# Patient Record
Sex: FEMALE | Race: WHITE | NOT HISPANIC OR LATINO | URBAN - METROPOLITAN AREA
[De-identification: names, ages, dates, MRNs, and addresses within clinical notes are randomized per-mention and may not be internally consistent; named-entity substitution may affect disease eponyms.]

---

## 2022-02-22 ENCOUNTER — INPATIENT (INPATIENT)
Facility: HOSPITAL | Age: 22
LOS: 6 days | Discharge: ANOTHER HOSPITAL | DRG: 949 | End: 2022-03-01
Attending: INTERNAL MEDICINE | Admitting: INTERNAL MEDICINE
Payer: COMMERCIAL

## 2022-02-22 VITALS
HEIGHT: 67 IN | SYSTOLIC BLOOD PRESSURE: 120 MMHG | TEMPERATURE: 98 F | DIASTOLIC BLOOD PRESSURE: 72 MMHG | HEART RATE: 74 BPM | WEIGHT: 194.89 LBS | OXYGEN SATURATION: 100 % | RESPIRATION RATE: 16 BRPM

## 2022-02-22 DIAGNOSIS — I72.0 ANEURYSM OF CAROTID ARTERY: ICD-10-CM

## 2022-02-22 RX ORDER — BRIVARACETAM 25 MG/1
75 TABLET, FILM COATED ORAL
Refills: 0 | Status: DISCONTINUED | OUTPATIENT
Start: 2022-02-22 | End: 2022-02-22

## 2022-02-22 RX ORDER — LANOLIN ALCOHOL/MO/W.PET/CERES
3 CREAM (GRAM) TOPICAL AT BEDTIME
Refills: 0 | Status: DISCONTINUED | OUTPATIENT
Start: 2022-02-22 | End: 2022-03-01

## 2022-02-22 RX ORDER — SENNA PLUS 8.6 MG/1
2 TABLET ORAL AT BEDTIME
Refills: 0 | Status: DISCONTINUED | OUTPATIENT
Start: 2022-02-22 | End: 2022-03-01

## 2022-02-22 RX ORDER — ACETAMINOPHEN 500 MG
650 TABLET ORAL EVERY 6 HOURS
Refills: 0 | Status: DISCONTINUED | OUTPATIENT
Start: 2022-02-22 | End: 2022-03-01

## 2022-02-22 RX ORDER — BRIVARACETAM 25 MG/1
150 TABLET, FILM COATED ORAL
Refills: 0 | Status: DISCONTINUED | OUTPATIENT
Start: 2022-02-22 | End: 2022-02-22

## 2022-02-22 RX ORDER — BRIVARACETAM 25 MG/1
150 TABLET, FILM COATED ORAL
Refills: 0 | Status: DISCONTINUED | OUTPATIENT
Start: 2022-02-22 | End: 2022-03-01

## 2022-02-22 RX ORDER — POLYETHYLENE GLYCOL 3350 17 G/17G
17 POWDER, FOR SOLUTION ORAL DAILY
Refills: 0 | Status: DISCONTINUED | OUTPATIENT
Start: 2022-02-22 | End: 2022-03-01

## 2022-02-22 RX ORDER — HEPARIN SODIUM 5000 [USP'U]/ML
5000 INJECTION INTRAVENOUS; SUBCUTANEOUS EVERY 12 HOURS
Refills: 0 | Status: DISCONTINUED | OUTPATIENT
Start: 2022-02-22 | End: 2022-02-25

## 2022-02-22 RX ADMIN — BRIVARACETAM 150 MILLIGRAM(S): 25 TABLET, FILM COATED ORAL at 22:38

## 2022-02-22 NOTE — H&P ADULT - NSHPPHYSICALEXAM_GEN_ALL_CORE
PHYSICAL EXAM  VITALS  T(C): 36.7 (02-22-22 @ 20:06), Max: 36.7 (02-22-22 @ 20:06)  HR: 74 (02-22-22 @ 20:06) (74 - 74)  BP: 120/72 (02-22-22 @ 20:06) (120/72 - 120/72)  RR: 16 (02-22-22 @ 20:06) (16 - 16)  SpO2: 100% (02-22-22 @ 20:06) (100% - 100%)    Gen - NAD, Comfortable  HEENT - NCAT, EOMI, MMM  Neck - Supple, No limited ROM  Pulm - CTAB, No wheeze, No rhonchi, No crackles  Cardiovascular - RRR, S1S2, No murmurs  Chest - good chest expansion, good respiratory effort  Abdomen - Soft, NT/ND, +BS  Extremities - No Cyanosis, no clubbing, no edema, no calf tenderness  Neuro-     Cognitive - awake, alert, oriented to person, place, date, year, and situation     Communication - Fluent, No dysarthria     Attention: Intact, able to state days of week chronologically     Judgement: Good evidence of judgement     Memory: Recall 3 objects immediate and 3 min later     Cranial Nerves - CN 2-12 intact. No facial asymmetry, Tongue midline, EOMI, Shoulder shrug intact     Motor -                     LEFT    UE - ShAB 5/5, EF 5/5, EE 5/5,  5/5                    RIGHT UE - ShAB 5/5, EF 5/5, EE 5/5,   5/5                    LEFT    LE - HF 5/5, KE 5/5, DF 5/5, PF 5/5                    RIGHT LE - HF 5/5, KE 5/5, DF 5/5, PF 5/5        Sensory - Intact  to LT     Reflexes - DTR Intact, No primitive reflexive     Coordination - FTN intact     Tone - normal  Psychiatric - Mood stable, Affect WNL  Skin: Unable to  assess perianal area, patient stated she is on her period and was not comfortable with it. Endorsed to nursing to follow-up. Skin is otherwise intact.

## 2022-02-22 NOTE — H&P ADULT - NSHPSOCIALHISTORY_GEN_ALL_CORE
Smoking - Denied  EtOH - Denied   Drugs - Denied     Patient is a student at [employer]  PTA: Independent in ADLs and ambulation     CURRENT FUNCTIONAL STATUS 2/21  Bed Mobility: CGA  Transfers: CGA  Gait: CGA, NO AD  LB dressing: set up

## 2022-02-22 NOTE — H&P ADULT - ASSESSMENT
ASSESSMENT/PLAN  This is a 22 YO female with PMH of  epilepsy who presented to [facility] on 2/5  <6 hours after discharge for seizure activity several days after head trauma when she tripped over mats. On 2/5,  she was awakened by paramedics who told her that her roommates called 911 due to her having seizure activity for approximately 45 seconds. + seizure activities while on neur unit requiring code BA.  CT head and MRI head w/o contrast did not demonstrate acute intracranial findings. MRA head demonstrated an incidental saccular aneurysm measuring 2-3mm on the left supraclinoid/ophthalmic artery branch of the ICA. 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 MRI Brain w/ w/o done showed no evidence of Leptomeningeal enhancement. LP complete by IR on 2/10 without Xanthochromia. Hospital course further c/b seizures which were treated with medication adjustment of which seizures subsisded. Thus far, there are no seizure like activity on vEEG. Patient now with gait Instability, ADL impairments and Functional impairments.    #Left ICA Carotid Ophthalmic Artery Aneurysm  - s/p angiogram  - aneurysm clipping?  - Start Comprehensive Rehab Program: PT/OT/ST  - PT: Focused on improving strength, endurance, coordination, balance, functional mobility, and transfers  - OT: Focused on improving strength, fine motor skills, coordination, posture and ADLs.    - ST: to diagnose and treat deficits in swallowing, cognition and communication.     #Seizure  - s/p multiple seizure episode with medication adjustment  - seizure precaution    #Pain management  - Tylenol PRN, Oxycodone PRN    #DVT ppx  - Heparin, SCD, TEDs    #GI ppx  - Protonix 40mg    #Bowel Regimen  - Senna, miralax PRN    #Bladder management  - BS on admission, and q 8 hours (SC if > 400)  - Monitor UO    #FEN   - Diet: Regular    #Precaution  - Fall, Aspiration, Seizure      #Skin:  - No active issues at this time  - Pressure injury/Skin: Turn Q2hrs while in bed, OOB to Chair, PT/OT       #Sleep:   - Maintain quiet hours and low stim environment.  - Melatonin PRN to maximize participation in therapy during the day.   - Monitor sleep logs    Outpatient Follow-up (Specialty/Name of physician):        MEDICAL PROGNOSIS: GOOD            REHAB POTENTIAL: GOOD             ESTIMATED DISPOSITION: HOME WITH HOME CARE            ELOS: 10-14 Days   EXPECTED THERAPY:     P.T. 1hr/day       O.T. 1hr/day      S.L.P. 1hr/day     P&O Unnecessary     EXP FREQUENCY: 5 days per 7 day period     PRESCREEN COMPARISON:   I have reviewed the prescreen information and I have found no relevant changes between the preadmission screening and my post admission evaluation     RATIONALE FOR INPATIENT ADMISSION - Patient demonstrates the following: (check all that apply)  [X] Medically appropriate for rehabilitation admission  [X] Has attainable rehab goals with an appropriate initial discharge plan  [X] Has rehabilitation potential (expected to make a significant improvement within a reasonable period of time)   [X] Requires close medical management by a rehab physician, rehab nursing care, Hospitalist and comprehensive interdisciplinary team (including PT, OT, & or SLP, Prosthetics and Orthotics)   ASSESSMENT/PLAN  This is a 20 YO female with PMH of  epilepsy who presented to [facility] on 2/5  <6 hours after discharge for seizure activity several days after head trauma when she tripped over mats. On 2/5,  she was awakened by paramedics who told her that her roommates called 911 due to her having seizure activity for approximately 45 seconds. + seizure activities while on neur unit requiring code BA.  CT head and MRI head w/o contrast did not demonstrate acute intracranial findings. MRA head demonstrated an incidental saccular aneurysm measuring 2-3mm on the left supraclinoid/ophthalmic artery branch of the ICA. 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 MRI Brain w/ w/o done showed no evidence of Leptomeningeal enhancement. LP complete by IR on 2/10 without Xanthochromia. Hospital course further c/b seizures which were treated with medication adjustment of which seizures subsisded. Thus far, there are no seizure like activity on vEEG. Patient now with gait Instability, ADL impairments and Functional impairments.    #Left ICA Carotid Ophthalmic Artery Aneurysm  - s/p angiogram. Aneurysm clipping deferred 2/2 multiple seizure  - Start Comprehensive Rehab Program: PT/OT/ST  - PT: Focused on improving strength, endurance, coordination, balance, functional mobility, and transfers  - OT: Focused on improving strength, fine motor skills, coordination, posture and ADLs.    - ST: to diagnose and treat deficits in swallowing, cognition and communication.  - OP f/u with NSGY     #Seizure  - s/p multiple seizure episode with medication adjustment  - c/w Briviact 150mg BID  - seizure precaution    #Pain management  - Tylenol PRN    #DVT ppx  - Heparin, SCD, TEDs    #Bowel Regimen  - Senna PRN, miralax daily    #Bladder management  - BS on admission, and q 8 hours (SC if > 400)  - Monitor UO    #FEN   - Diet: Regular    #Precaution  - Fall, Aspiration, Seizure      #Skin:  - No active issues at this time  - Pressure injury/Skin: Turn Q2hrs while in bed, OOB to Chair, PT/OT     #Sleep:   - Maintain quiet hours and low stim environment.  - Melatonin PRN to maximize participation in therapy during the day.   - Monitor sleep logs    Outpatient Follow-up (Specialty/Name of physician):  PCP  NEUROLOGY  NSGY      MEDICAL PROGNOSIS: GOOD            REHAB POTENTIAL: GOOD             ESTIMATED DISPOSITION: HOME WITH HOME CARE            ELOS: 10-14 Days   EXPECTED THERAPY:     P.T. 1hr/day       O.T. 1hr/day      S.L.P. 1hr/day     P&O Unnecessary     EXP FREQUENCY: 5 days per 7 day period     PRESCREEN COMPARISON:   I have reviewed the prescreen information and I have found no relevant changes between the preadmission screening and my post admission evaluation     RATIONALE FOR INPATIENT ADMISSION - Patient demonstrates the following: (check all that apply)  [X] Medically appropriate for rehabilitation admission  [X] Has attainable rehab goals with an appropriate initial discharge plan  [X] Has rehabilitation potential (expected to make a significant improvement within a reasonable period of time)   [X] Requires close medical management by a rehab physician, rehab nursing care, Hospitalist and comprehensive interdisciplinary team (including PT, OT, & or SLP, Prosthetics and Orthotics)   ASSESSMENT/PLAN  This is a 22 YO female with PMH of  epilepsy who presented to [facility] on 2/5  <6 hours after discharge for seizure activity several days after head trauma when she tripped over mats. On 2/5,  she was awakened by paramedics who told her that her roommates called 911 due to her having seizure activity for approximately 45 seconds. + seizure activities while on neur unit requiring code BA.  CT head and MRI head w/o contrast did not demonstrate acute intracranial findings. MRA head demonstrated an incidental saccular aneurysm measuring 2-3mm on the left supraclinoid/ophthalmic artery branch of the ICA. 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 MRI Brain w/ w/o done showed no evidence of Leptomeningeal enhancement. LP complete by IR on 2/10 without Xanthochromia. Hospital course further c/b seizures which were treated with medication adjustment of which seizures subsisded. Thus far, there are no seizure like activity on vEEG. Patient now with gait Instability, ADL impairments and Functional impairments.      #Seizure  - s/p multiple seizure episode with medication adjustment  - c/w Briviact 150mg BID  - seizure precaution  - commence Comprehensive Rehab Program: PT/OT/ST  - PT: Focused on improving strength, endurance, coordination, balance, functional mobility, and transfers  - OT: Focused on improving strength, fine motor skills, coordination, posture and ADLs.    - ST: to diagnose and treat deficits in swallowing, cognition and communication.  - OP f/u with NSGY     #Left ICA Carotid Ophthalmic Artery Aneurysm  - s/p angiogram. Aneurysm clipping deferred 2/2 multiple seizure    #Seizure  - s/p multiple seizure episode with medication adjustment  - c/w Briviact 150mg BID  - seizure precaution    #Pain management  - Tylenol PRN    #DVT ppx  - Heparin, SCD, TEDs    #Bowel Regimen  - Senna PRN, miralax daily    #Bladder management  - BS on admission, and q 8 hours (SC if > 400)  - Monitor UO    #FEN  Diet: Regular    #Precaution Fall, Aspiration, Seizure      #Skin:--unremarkable     Outpatient Follow-up (Specialty/Name of physician):  PCP  NEUROLOGY  NSGY      MEDICAL PROGNOSIS: GOOD            REHAB POTENTIAL: GOOD             ESTIMATED DISPOSITION: HOME WITH HOME CARE            ELOS: 10-14 Days   EXPECTED THERAPY:     P.T. 1hr/day       O.T. 1hr/day      S.L.P. 1hr/day     P&O Unnecessary     EXP FREQUENCY: 5 days per 7 day period     PRESCREEN COMPARISON:   I have reviewed the prescreen information and I have found no relevant changes between the preadmission screening and my post admission evaluation     RATIONALE FOR INPATIENT ADMISSION - Patient demonstrates the following: (check all that apply)  [X] Medically appropriate for rehabilitation admission  [X] Has attainable rehab goals with an appropriate initial discharge plan  [X] Has rehabilitation potential (expected to make a significant improvement within a reasonable period of time)   [X] Requires close medical management by a rehab physician, rehab nursing care, Hospitalist and comprehensive interdisciplinary team (including PT, OT, & or SLP, Prosthetics and Orthotics)

## 2022-02-22 NOTE — H&P ADULT - HISTORY OF PRESENT ILLNESS
This is a 20 YO female with known epilepsy who presented to [facility] ER on 2/5  <6 hours after discharge for seizure activity several days after head trauma when she tripped over mats. Patient was recently admitted for seizure activity after head trauma and received MRI of brain + cervical cord spinal w/o contrast, EEG that showed no epileptiform activity. She was discharged home on 2/4 evening. On 2/5,  she was awakened by paramedics and was told that her roommates had called 911 due to her having seizure activity for approximately 45 seconds. Episode was staring for 45 seconds and being unresponsive. No fall, no trauma. She is not postictal. Patient was brought to ER for evaluation on evening of 2/4. In ER, patient received head CT w/o Contrast which was with no intracranial pathology, CXR with final read pending but no acute concerns, and was admitted to pediatric neurology service for multi-hour vEEG. In ED, CBC, Chem, LFTs, lactic acid, Utox reassuring. Beta-quant negative. COVID-19 PCR negative.    Upon arrival on the neurology floor, she  was  NAD, conversant, oriented. Patient stated that she remembers leaving the hospital and going to  her Briavact medication and Valtoco rescue, heading back home to her dorm where she instructed her teammates on how to use the rescue Valtoco, and then went to sleep on an air mattress. Patient states that she has been very sleep deprived during the last hospitalization, and that she felt that she needed to get rest. She then remembered having a seizure which felt similar to her previous complex partial seizures, patient did not have loss of consciousness per patient, however, she does not fully recall the circumstances of the event. No tongue biting, no loss of urine. Although patient was told that she had an absence seizure, patient felt that it felt similar to previous complex partial. The seizure felt milder than previous seizures, and patient’s teammates did not administer Valtoco. Patient states that she wanted to stay at home and sleep following the episode but was told by responding authorities to present to ED.       While on the Pediatric Neurology service she had a seizure and was subsequently loaded with Fosphenytoin. However patient never came back to baseline and CODE BAT was called. CT head and MRI head w/o contrast did not demonstrate acute intracranial findings. MRA head demonstrated an incidental saccular aneurysm measuring 2-3mm on the left supraclinoid/ophthalmic artery branch of the ICA. She was subsequently transferred to [facility] for LP and Angiogram. 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 MRI Brain w/ w/o done showed no evidence of Leptomeningeal enhancement. LP complete by IR on 2/10 without Xanthochromia.     On 2/11/21, she had 3 clinical seizures not captured on VEEG. Loaded with Phenytoin 400mg and continued with 200mg q8. VEEG placed back on without any evidence of seizure activity. Than on 2/12 patient agitated and was placed on Precedex drip. On 2/12/22 Phenytoin level critical and was discontinued. On 2/13 overnight patient had 4 seizure events that were not captured on vEEG. Originally was going to have Aneurysm clipped however due to seizures and thought to be incidental in nature in anyways procedure was delayed. Patient downgraded to General Neurology for VEEG monitoring. She has been down titrated off Briviact in hopes to catch an event to make a definitive diagnosis of Seizure disorder vs PNES (Psychogenic nonepileptic seizure). Thus far, there are no seizure like activity on vEEG. Patient was evaluated by PM&R and therapy for functional deficits, gait/ADL impairments and acute rehabilitation was recommended. Patient was medically optimized for discharge to [facility] IRU on 2/22/22.     This is a 20 YO female with known epilepsy who presented to [facility] ER on 2/5  <6 hours after discharge for seizure activity several days after head trauma when she tripped over mats. Patient was recently admitted for seizure activity after head trauma and received MRI of brain + cervical cord spinal w/o contrast, EEG that showed no epileptiform activity. She was discharged home on 2/4 evening. On 2/5,  she was awakened by paramedics and was told that her roommates had called 911 due to her having seizure activity for approximately 45 seconds. Episode was staring for 45 seconds and being unresponsive. No fall, no trauma. She is not postictal. Patient was brought to ER for evaluation on evening of 2/4. In ER, patient received head CT w/o Contrast which was with no intracranial pathology, CXR with final read pending but no acute concerns, and was admitted to pediatric neurology service for multi-hour vEEG. In ED, CBC, Chem, LFTs, lactic acid, Utox reassuring. Beta-quant negative. COVID-19 PCR negative.    Upon arrival on the neurology floor, she  was  NAD, conversant, oriented. Patient stated that she remembers leaving the hospital and going to  her Briavact medication and Valtoco rescue, heading back home to her dorm where she instructed her teammates on how to use the rescue Valtoco, and then went to sleep on an air mattress. Patient states that she has been very sleep deprived during the last hospitalization, and that she felt that she needed to get rest. She then remembered having a seizure which felt similar to her previous complex partial seizures, patient did not have loss of consciousness per patient, however, she does not fully recall the circumstances of the event. No tongue biting, no loss of urine. Although patient was told that she had an absence seizure, patient felt that it felt similar to previous complex partial. The seizure felt milder than previous seizures, and patient’s teammates did not administer Valtoco. Patient states that she wanted to stay at home and sleep following the episode but was told by responding authorities to present to ED.       While on the Pediatric Neurology service she had a seizure and was subsequently loaded with Fosphenytoin. However patient never came back to baseline and CODE BAT was called. CT head and MRI head w/o contrast did not demonstrate acute intracranial findings. MRA head demonstrated an incidental saccular aneurysm measuring 2-3mm on the left supraclinoid/ophthalmic artery branch of the ICA. She was subsequently transferred to N[facility]U for LP and Angiogram. 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 MRI Brain w/ w/o done showed no evidence of Leptomeningeal enhancement. LP complete by IR on 2/10 without Xanthochromia.     On 2/11/21, she had 3 clinical seizures not captured on VEEG. Loaded with Phenytoin 400mg and continued with 200mg q8. VEEG placed back on without any evidence of seizure activity. Than on 2/12 patient agitated and was placed on Precedex drip. On 2/12/22 Phenytoin level critical and was discontinued. On 2/13 overnight patient had 4 seizure events that were not captured on vEEG. Originally was going to have Aneurysm clipped however due to seizures and thought to be incidental in nature in anyways procedure was delayed. Patient downgraded to General Neurology for VEEG monitoring. She has been down titrated off Briviact in hopes to catch an event to make a definitive diagnosis of Seizure disorder vs PNES (Psychogenic nonepileptic seizure). Thus far, there are no seizure like activity on vEEG.  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 she was placed back on Briviact 150mg BID  with recommendation to f/u with neurology OP. Patient was evaluated by PM&R and therapy for functional deficits, gait/ADL impairments and acute rehabilitation was recommended. Patient was medically optimized for discharge to [facility] IRU on 2/22/22.

## 2022-02-22 NOTE — PATIENT PROFILE ADULT - FALL HARM RISK - HARM RISK INTERVENTIONS
Communicate Risk of Fall with Harm to all staff/Reinforce activity limits and safety measures with patient and family/Tailored Fall Risk Interventions/Visual Cue: Yellow wristband and red socks/Bed in lowest position, wheels locked, appropriate side rails in place/Call bell, personal items and telephone in reach/Instruct patient to call for assistance before getting out of bed or chair/Non-slip footwear when patient is out of bed/[place] to call system/Physically safe environment - no spills, clutter or unnecessary equipment/Purposeful Proactive Rounding/Room/bathroom lighting operational, light cord in reach Assistance with ambulation/Assistance OOB with selected safe patient handling equipment/Communicate Risk of Fall with Harm to all staff/Discuss with provider need for PT consult/Monitor gait and stability/Provide patient with walking aids - walker, cane, crutches/Reinforce activity limits and safety measures with patient and family/Tailored Fall Risk Interventions/Visual Cue: Yellow wristband and red socks/Bed in lowest position, wheels locked, appropriate side rails in place/Call bell, personal items and telephone in reach/Instruct patient to call for assistance before getting out of bed or chair/Non-slip footwear when patient is out of bed/[place] to call system/Physically safe environment - no spills, clutter or unnecessary equipment/Purposeful Proactive Rounding/Room/bathroom lighting operational, light cord in reach

## 2022-02-22 NOTE — H&P ADULT - NSHPLABSRESULTS_GEN_ALL_CORE
Laboratory-Chemistry:    2/22/22    Glucose: 85    Sodium: 139    Potassium: 4.8    Blood Urea Nitrogen : 11    Creatinine: 0.69       Hematology: 2/22/22    WBC: 11.2    HgB: 12.9    Hct: 40.3    Platelets: 265    2/21/22    WBC: 8.46    HgB: 13.8    Hct: 42.5    Platelets: 253   Cultures:     2/21/22 COVID PCR: not detected     Radiology:       EEG 2/19:  EPILEPTIFORM ACTIVITY: No epileptiform discharges in this study. SEIZURES: No seizures are recorded.   IVs:  IV Access: Saline Lock Laboratory-Chemistry: 2/22/22    Glucose: 85    Sodium: 139    Potassium: 4.8    Blood Urea Nitrogen : 11    Creatinine: 0.69     Hematology: 2/22/22    WBC: 11.2    HgB: 12.9    Hct: 40.3    Platelets: 265    2/21/22    WBC: 8.46    HgB: 13.8    Hct: 42.5    Platelets: 253     Cultures:     2/21/22 COVID PCR: not detected     Radiology:       EEG 2/19:  EPILEPTIFORM ACTIVITY: No epileptiform discharges in this study. SEIZURES: No seizures are recorded.   IVs:  IV Access: Saline Lock

## 2022-02-22 NOTE — H&P ADULT - ATTENDING COMMENTS
Seen and examined, findings as noted with revisions. additions as stated below    20 y/o F, no significant PMH, visiting [place] from [place] for [employer] sports  Fall and subsequent seizure activity, and hosp admission/dc 2/4 with uinremarkable investigation result  Readmission same few hours prior 2/5 with recurrent seizure activity, subsequent withnessed seizures in hosp requiring treatment with antiepileptic meds up till discharge  However vEEG has not captured seiuzre activity, investigations, including utox, LP unremarkable but has incidental finding of a Left ICA Carotid Ophthalmic Artery Aneurysm. Definitive surgical treatment (embolization) deferred due to seizure activity  Acute Rehab admission 2/22    Pleasant during review, reports no acute stress, emotional or psychological stress.  Admits to intermittent and infrequent head ache   No hx of oral contraceptive use    Has stable family support. Mother currently in [place] to support her    Independent with ADLs/IADs,   Post acute care, now requiring contact guard assistance/supervision for transfers/mobility/gait    AAO X 3  No speech or motor deficits  LT sensation normal  Good static gait    Has unremarkable physical exam  Admission lab results--unremarkable    Dx: Seizure (recurrent) disorder ? etiology  Subsequent mild compromise of function--requiring supervision and CGA to function  Commence PT/OT--addressing gait/balance and ADLs  - PT: Focused on improving strength, endurance, coordination, balance, functional mobility, and transfers  - OT: Focused on improving strength, fine motor skills, coordination, posture and ADLs.    - ST: to diagnose and treat deficits in swallowing, cognition and communication.    - c/w Briviact 150mg BID  - seizure precaution    Left ICA Carotid Ophthalmic Artery Aneurysm- Aneurysm clipping deferred 2/2 multiple seizure  - OP f/u with NSGY   Continue all supportive treatments  Await collateral hx from family  Est dc to be decided at team conference

## 2022-02-23 LAB
ALBUMIN SERPL ELPH-MCNC: 3.3 G/DL — SIGNIFICANT CHANGE UP (ref 3.3–5)
ALP SERPL-CCNC: 40 U/L — SIGNIFICANT CHANGE UP (ref 40–120)
ALT FLD-CCNC: 34 U/L — SIGNIFICANT CHANGE UP (ref 10–45)
ANION GAP SERPL CALC-SCNC: 9 MMOL/L — SIGNIFICANT CHANGE UP (ref 5–17)
AST SERPL-CCNC: 16 U/L — SIGNIFICANT CHANGE UP (ref 10–40)
BASOPHILS # BLD AUTO: 0.05 K/UL — SIGNIFICANT CHANGE UP (ref 0–0.2)
BASOPHILS NFR BLD AUTO: 0.6 % — SIGNIFICANT CHANGE UP (ref 0–2)
BILIRUB SERPL-MCNC: 0.2 MG/DL — SIGNIFICANT CHANGE UP (ref 0.2–1.2)
BUN SERPL-MCNC: 13 MG/DL — SIGNIFICANT CHANGE UP (ref 7–23)
CALCIUM SERPL-MCNC: 8.7 MG/DL — SIGNIFICANT CHANGE UP (ref 8.4–10.5)
CHLORIDE SERPL-SCNC: 106 MMOL/L — SIGNIFICANT CHANGE UP (ref 96–108)
CO2 SERPL-SCNC: 26 MMOL/L — SIGNIFICANT CHANGE UP (ref 22–31)
CREAT SERPL-MCNC: 0.73 MG/DL — SIGNIFICANT CHANGE UP (ref 0.5–1.3)
EOSINOPHIL # BLD AUTO: 0.24 K/UL — SIGNIFICANT CHANGE UP (ref 0–0.5)
EOSINOPHIL NFR BLD AUTO: 2.7 % — SIGNIFICANT CHANGE UP (ref 0–6)
GLUCOSE SERPL-MCNC: 88 MG/DL — SIGNIFICANT CHANGE UP (ref 70–99)
HCT VFR BLD CALC: 39.6 % — SIGNIFICANT CHANGE UP (ref 34.5–45)
HGB BLD-MCNC: 12.6 G/DL — SIGNIFICANT CHANGE UP (ref 11.5–15.5)
IMM GRANULOCYTES NFR BLD AUTO: 0.5 % — SIGNIFICANT CHANGE UP (ref 0–1.5)
LYMPHOCYTES # BLD AUTO: 2.23 K/UL — SIGNIFICANT CHANGE UP (ref 1–3.3)
LYMPHOCYTES # BLD AUTO: 25.3 % — SIGNIFICANT CHANGE UP (ref 13–44)
MCHC RBC-ENTMCNC: 30.4 PG — SIGNIFICANT CHANGE UP (ref 27–34)
MCHC RBC-ENTMCNC: 31.8 GM/DL — LOW (ref 32–36)
MCV RBC AUTO: 95.4 FL — SIGNIFICANT CHANGE UP (ref 80–100)
MONOCYTES # BLD AUTO: 0.75 K/UL — SIGNIFICANT CHANGE UP (ref 0–0.9)
MONOCYTES NFR BLD AUTO: 8.5 % — SIGNIFICANT CHANGE UP (ref 2–14)
NEUTROPHILS # BLD AUTO: 5.49 K/UL — SIGNIFICANT CHANGE UP (ref 1.8–7.4)
NEUTROPHILS NFR BLD AUTO: 62.4 % — SIGNIFICANT CHANGE UP (ref 43–77)
NRBC # BLD: 0 /100 WBCS — SIGNIFICANT CHANGE UP (ref 0–0)
PLATELET # BLD AUTO: 241 K/UL — SIGNIFICANT CHANGE UP (ref 150–400)
POTASSIUM SERPL-MCNC: 3.9 MMOL/L — SIGNIFICANT CHANGE UP (ref 3.5–5.3)
POTASSIUM SERPL-SCNC: 3.9 MMOL/L — SIGNIFICANT CHANGE UP (ref 3.5–5.3)
PROT SERPL-MCNC: 7 G/DL — SIGNIFICANT CHANGE UP (ref 6–8.3)
RBC # BLD: 4.15 M/UL — SIGNIFICANT CHANGE UP (ref 3.8–5.2)
RBC # FLD: 14.2 % — SIGNIFICANT CHANGE UP (ref 10.3–14.5)
SARS-COV-2 RNA SPEC QL NAA+PROBE: SIGNIFICANT CHANGE UP
SODIUM SERPL-SCNC: 141 MMOL/L — SIGNIFICANT CHANGE UP (ref 135–145)
WBC # BLD: 8.8 K/UL — SIGNIFICANT CHANGE UP (ref 3.8–10.5)
WBC # FLD AUTO: 8.8 K/UL — SIGNIFICANT CHANGE UP (ref 3.8–10.5)

## 2022-02-23 PROCEDURE — 99223 1ST HOSP IP/OBS HIGH 75: CPT

## 2022-02-23 PROCEDURE — 99222 1ST HOSP IP/OBS MODERATE 55: CPT

## 2022-02-23 RX ADMIN — Medication 3 MILLIGRAM(S): at 22:06

## 2022-02-23 RX ADMIN — BRIVARACETAM 150 MILLIGRAM(S): 25 TABLET, FILM COATED ORAL at 06:19

## 2022-02-23 RX ADMIN — HEPARIN SODIUM 5000 UNIT(S): 5000 INJECTION INTRAVENOUS; SUBCUTANEOUS at 18:27

## 2022-02-23 RX ADMIN — POLYETHYLENE GLYCOL 3350 17 GRAM(S): 17 POWDER, FOR SOLUTION ORAL at 12:16

## 2022-02-23 RX ADMIN — BRIVARACETAM 150 MILLIGRAM(S): 25 TABLET, FILM COATED ORAL at 18:59

## 2022-02-23 RX ADMIN — HEPARIN SODIUM 5000 UNIT(S): 5000 INJECTION INTRAVENOUS; SUBCUTANEOUS at 06:19

## 2022-02-23 NOTE — DIETITIAN INITIAL EVALUATION ADULT. - ADD RECOMMEND
1) Monitor Weights, Intake, Tolerance, Skin & Labwork   2) Education Provided on Proper Nutrition & Need for Increased Fluids 3) Continue Nutrition Plan of Care

## 2022-02-23 NOTE — DIETITIAN INITIAL EVALUATION ADULT. - PERSON TAUGHT/METHOD
Education Provided on Proper Nutrition & Need for Increased Fluids/verbal instruction/patient instructed

## 2022-02-23 NOTE — DIETITIAN INITIAL EVALUATION ADULT. - OTHER INFO
Initial Nutrition Assessment   21yr Old Female   Dx: Left ICA Carotid Ophthalmic Artery Aneurysm  Diet - Regular Diet (IDDSI Level 7) w/ Thin Liquids (IDDSI Level 0)   Denies Food Allergy/Intolerance  Tolerates Diet Well - No Chewing/Swallowing Complications (Per Patient)  No Pressure Ulcers (as Per Nursing Flow Sheets)  No Edema Noted (as Per Nursing Flow Sheets)  No Recent Nausea/Vomiting/Diarrhea & Some Recent Constipation (as Per Patient)

## 2022-02-23 NOTE — DIETITIAN INITIAL EVALUATION ADULT. - ORAL INTAKE PTA/DIET HISTORY
Patient Does Not Follow Diet @Home, Consumes 2 Meals a Day w/ Occasioanl Snack or Protein Drink if Training for Lacrosse, Usually Eats @ [employer] [employer] & Does Take Vitamin/Supplements @Home (Iron, Magnesium, Zinc, Multivitamin, & Pea/Whey Protien)    on Regular Diet (IDDSI Level 7) w/ Thin Liquids (IDDSI Level 0)   Education Provided on Proper Nutrition & Need for Increased Fluids

## 2022-02-23 NOTE — DIETITIAN INITIAL EVALUATION ADULT. - EDUCATION DIETARY MODIFICATIONS
Education Provided on Proper Nutrition & Need for Increased Fluids/(2) meets goals/outcomes/verbalization

## 2022-02-24 LAB
ALBUMIN SERPL ELPH-MCNC: 3.8 G/DL — SIGNIFICANT CHANGE UP (ref 3.3–5)
ALP SERPL-CCNC: 45 U/L — SIGNIFICANT CHANGE UP (ref 40–120)
ALT FLD-CCNC: 35 U/L — SIGNIFICANT CHANGE UP (ref 10–45)
ANION GAP SERPL CALC-SCNC: 11 MMOL/L — SIGNIFICANT CHANGE UP (ref 5–17)
AST SERPL-CCNC: 17 U/L — SIGNIFICANT CHANGE UP (ref 10–40)
BILIRUB SERPL-MCNC: 0.2 MG/DL — SIGNIFICANT CHANGE UP (ref 0.2–1.2)
BUN SERPL-MCNC: 13 MG/DL — SIGNIFICANT CHANGE UP (ref 7–23)
CALCIUM SERPL-MCNC: 9.4 MG/DL — SIGNIFICANT CHANGE UP (ref 8.4–10.5)
CHLORIDE SERPL-SCNC: 105 MMOL/L — SIGNIFICANT CHANGE UP (ref 96–108)
CO2 SERPL-SCNC: 28 MMOL/L — SIGNIFICANT CHANGE UP (ref 22–31)
CREAT SERPL-MCNC: 0.92 MG/DL — SIGNIFICANT CHANGE UP (ref 0.5–1.3)
GLUCOSE SERPL-MCNC: 83 MG/DL — SIGNIFICANT CHANGE UP (ref 70–99)
HCT VFR BLD CALC: 41.4 % — SIGNIFICANT CHANGE UP (ref 34.5–45)
HGB BLD-MCNC: 13.1 G/DL — SIGNIFICANT CHANGE UP (ref 11.5–15.5)
MCHC RBC-ENTMCNC: 30 PG — SIGNIFICANT CHANGE UP (ref 27–34)
MCHC RBC-ENTMCNC: 31.6 GM/DL — LOW (ref 32–36)
MCV RBC AUTO: 94.7 FL — SIGNIFICANT CHANGE UP (ref 80–100)
NRBC # BLD: 0 /100 WBCS — SIGNIFICANT CHANGE UP (ref 0–0)
PLATELET # BLD AUTO: 257 K/UL — SIGNIFICANT CHANGE UP (ref 150–400)
POTASSIUM SERPL-MCNC: 3.7 MMOL/L — SIGNIFICANT CHANGE UP (ref 3.5–5.3)
POTASSIUM SERPL-SCNC: 3.7 MMOL/L — SIGNIFICANT CHANGE UP (ref 3.5–5.3)
PROT SERPL-MCNC: 7.7 G/DL — SIGNIFICANT CHANGE UP (ref 6–8.3)
RBC # BLD: 4.37 M/UL — SIGNIFICANT CHANGE UP (ref 3.8–5.2)
RBC # FLD: 14.3 % — SIGNIFICANT CHANGE UP (ref 10.3–14.5)
SODIUM SERPL-SCNC: 144 MMOL/L — SIGNIFICANT CHANGE UP (ref 135–145)
WBC # BLD: 8.84 K/UL — SIGNIFICANT CHANGE UP (ref 3.8–10.5)
WBC # FLD AUTO: 8.84 K/UL — SIGNIFICANT CHANGE UP (ref 3.8–10.5)

## 2022-02-24 PROCEDURE — 99233 SBSQ HOSP IP/OBS HIGH 50: CPT

## 2022-02-24 RX ADMIN — HEPARIN SODIUM 5000 UNIT(S): 5000 INJECTION INTRAVENOUS; SUBCUTANEOUS at 17:56

## 2022-02-24 RX ADMIN — Medication 3 MILLIGRAM(S): at 21:52

## 2022-02-24 RX ADMIN — BRIVARACETAM 150 MILLIGRAM(S): 25 TABLET, FILM COATED ORAL at 17:56

## 2022-02-24 RX ADMIN — POLYETHYLENE GLYCOL 3350 17 GRAM(S): 17 POWDER, FOR SOLUTION ORAL at 12:44

## 2022-02-24 RX ADMIN — HEPARIN SODIUM 5000 UNIT(S): 5000 INJECTION INTRAVENOUS; SUBCUTANEOUS at 05:28

## 2022-02-24 RX ADMIN — BRIVARACETAM 150 MILLIGRAM(S): 25 TABLET, FILM COATED ORAL at 05:26

## 2022-02-24 NOTE — PROGRESS NOTE ADULT - ASSESSMENT
ASSESSMENT/PLAN  This is a 20 YO female with PMH of  epilepsy who presented to [place] ER on 2/5  <6 hours after discharge for seizure activity several days after head trauma when she tripped over mats. On 2/5,  she was awakened by paramedics who told her that her roommates called 911 due to her having seizure activity for approximately 45 seconds. + seizure activities while on neur unit requiring code BA.  CT head and MRI head w/o contrast did not demonstrate acute intracranial findings. MRA head demonstrated an incidental saccular aneurysm measuring 2-3mm on the left supraclinoid/ophthalmic artery branch of the ICA. 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 MRI Brain w/ w/o done showed no evidence of Leptomeningeal enhancement. LP complete by IR on 2/10 without Xanthochromia. Hospital course further c/b seizures which were treated with medication adjustment of which seizures subsisded. Thus far, there are no seizure like activity on vEEG. Patient now with gait Instability, ADL impairments and Functional impairments.      #Seizure  - s/p multiple seizure episode with medication adjustment  - c/w Briviact 150mg BID  - seizure precaution  - commence Comprehensive Rehab Program: PT/OT/ST  - PT: Focused on improving strength, endurance, coordination, balance, functional mobility, and transfers  - OT: Focused on improving strength, fine motor skills, coordination, posture and ADLs.    - ST: to diagnose and treat deficits in swallowing, cognition and communication.  - OP f/u with NSGY     #Left ICA Carotid Ophthalmic Artery Aneurysm  - s/p angiogram. Aneurysm clipping deferred 2/2 multiple seizure    #Seizure  - s/p multiple seizure episode with medication adjustment  - c/w Briviact 150mg BID  - seizure precaution    #Pain management  - Tylenol PRN    #DVT ppx  - Heparin, SCD, TEDs    #Bowel Regimen  - Senna PRN, miralax daily    #Bladder management  - BS on admission, and q 8 hours (SC if > 400)  - Monitor UO    #FEN  Diet: Regular    #Precaution Fall, Aspiration, Seizure      #Skin:--unremarkable     Outpatient Follow-up (Specialty/Name of physician):  PCP  NEUROLOGY  NSGY ASSESSMENT/PLAN  This is a 22 YO female with PMH of  epilepsy who presented to [place] ER on 2/5  <6 hours after discharge for seizure activity several days after head trauma when she tripped over mats. On 2/5,  she was awakened by paramedics who told her that her roommates called 911 due to her having seizure activity for approximately 45 seconds. + seizure activities while on neur unit requiring code BA.  CT head and MRI head w/o contrast did not demonstrate acute intracranial findings. MRA head demonstrated an incidental saccular aneurysm measuring 2-3mm on the left supraclinoid/ophthalmic artery branch of the ICA. 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 MRI Brain w/ w/o done showed no evidence of Leptomeningeal enhancement. LP complete by IR on 2/10 without Xanthochromia. Hospital course further c/b seizures which were treated with medication adjustment of which seizures subsisded. Thus far, there are no seizure like activity on vEEG. Patient now with gait Instability, ADL impairments and Functional impairments.      #Seizure  - s/p multiple seizure episode with medication adjustment  - c/w Briviact 150mg BID  - seizure precaution  - commence Comprehensive Rehab Program: PT/OT/ST  - PT: Focused on improving strength, endurance, coordination, balance, functional mobility, and transfers  - OT: Focused on improving strength, fine motor skills, coordination, posture and ADLs.    - ST: to diagnose and treat deficits in swallowing, cognition and communication.  - OP f/u with NSGY     #Left ICA Carotid Ophthalmic Artery Aneurysm  - s/p angiogram. Aneurysm clipping deferred 2/2 multiple seizure    #Seizure  - s/p multiple seizure episode with medication adjustment  - c/w Briviact 150mg BID  - seizure precaution    #Pain management  - Tylenol PRN    #DVT ppx  - Heparin, SCD, TEDs    #Bowel Regimen  - Senna PRN, miralax daily    #Bladder management  - BS on admission, and q 8 hours (SC if > 400)  - Monitor UO    #FEN  Diet: Regular    #Precaution Fall, Aspiration, Seizure      #Dispo: IDR 02/24/2022  - OT: independent eating; Stefany grooming; supervision for rest of ADLs  - SLP: decreased recall and concentration  - TDD: 3/1 to [facility]    Outpatient Follow-up (Specialty/Name of physician):  PCP  NEUROLOGY  NSGY ASSESSMENT/PLAN  This is a 20 YO female with PMH of  epilepsy who presented to [place] ER on 2/5  <6 hours after discharge for seizure activity several days after head trauma when she tripped over mats. On 2/5,  she was awakened by paramedics who told her that her roommates called 911 due to her having seizure activity for approximately 45 seconds. + seizure activities while on neur unit requiring code BA.  CT head and MRI head w/o contrast did not demonstrate acute intracranial findings. MRA head demonstrated an incidental saccular aneurysm measuring 2-3mm on the left supraclinoid/ophthalmic artery branch of the ICA. 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 MRI Brain w/ w/o done showed no evidence of Leptomeningeal enhancement. LP complete by IR on 2/10 without Xanthochromia. Hospital course further c/b seizures which were treated with medication adjustment of which seizures subsisded. Thus far, there are no seizure like activity on vEEG. Patient now with gait Instability, ADL impairments and Functional impairments.      #Seizure  - s/p multiple seizure episode with medication adjustment  - c/w Briviact 150mg BID  - seizure precaution  - commence Comprehensive Rehab Program: PT/OT/ST  - PT: Focused on improving strength, endurance, coordination, balance, functional mobility, and transfers  - OT: Focused on improving strength, fine motor skills, coordination, posture and ADLs.    - ST: to diagnose and treat deficits in swallowing, cognition and communication.  - OP f/u with NSGY     #Left ICA Carotid Ophthalmic Artery Aneurysm  - s/p angiogram. 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

## 2022-02-24 NOTE — PROGRESS NOTE ADULT - SUBJECTIVE AND OBJECTIVE BOX
Subjective:  Patient was seen and examined at the bedside this AM. No acute overnight events.     ROS:  Did not report fever, chills, nausea, vomiting, CP, palpitations, coughing, wheezing, SOB, or abdominal pain. Last BM was on       Physical Exam:  Vital Signs Last 24 Hrs  T(C): 36.6 (2022 21:59), Max: 36.6 (2022 21:59)  T(F): 97.9 (2022 21:59), Max: 97.9 (2022 21:59)  HR: 55 (2022 21:59) (55 - 55)  BP: 117/70 (2022 21:59) (117/70 - 117/70)  BP(mean): --  RR: 16 (2022 21:59) (16 - 16)  SpO2: 98% (2022 21:59) (98% - 98%)      Constitutional - NAD, Comfortable  Chest - Good chest expansion. Breathing comfortably on RA  Cardiovascular - Warm and well perfused; no LE edema  Abdomen - Soft, NTND  Extremities - No calf tenderness   Neurologic Exam -                    Cognitive - Awake and alert; answering questions appropriately; following commands     Communication - Fluent, No dysarthria     Motor -                     LEFT    UE - ShAB 5/5, EF 5/5, EE 5/5, WE 5/5,  5/5                    RIGHT UE - ShAB 5/5, EF 5/5, EE 5/5, WE 5/5,  5/5                    LEFT    LE - HF 5/5, KE 5/5, DF 5/5, PF 5/5                    RIGHT LE - HF 5/5, KE 5/5, DF 5/5, PF 5/5  Psychiatric - Mood stable, Affect WNL      Recent Labs/Imagin.1   8.84  )-----------( 257      ( 2022 05:45 )             41.4     02-    144  |  105  |  13  ----------------------------<  83  3.7   |  28  |  0.92    Ca    9.4      2022 05:45    TPro  7.7  /  Alb  3.8  /  TBili  0.2  /  DBili  x   /  AST  17  /  ALT  35  /  AlkPhos  45              Medications:  acetaminophen     Tablet .. 650 milliGRAM(s) Oral every 6 hours PRN  brivaracetam 150 milliGRAM(s) Oral two times a day  Diazepam (Valtoco) 20mg Nasal Spray 10 milliGRAM(s) 10 milliGRAM(s) Both Nostrils once PRN  heparin   Injectable 5000 Unit(s) SubCutaneous every 12 hours  melatonin 3 milliGRAM(s) Oral at bedtime PRN  polyethylene glycol 3350 17 Gram(s) Oral daily  senna 2 Tablet(s) Oral at bedtime PRN   Subjective:  Patient was seen and examined during recreation therapy this AM. No acute overnight events.   Reports improved sleep last night after switching to a different hospital bed. No acute medical issues reported this AM.     ROS:  Did not report fever, chills, nausea, vomiting, CP, palpitations, coughing, wheezing, SOB, or abdominal pain. Last BM was on       Physical Exam:  Vital Signs Last 24 Hrs  T(C): 36.6 (2022 21:59), Max: 36.6 (2022 21:59)  T(F): 97.9 (2022 21:59), Max: 97.9 (2022 21:59)  HR: 55 (2022 21:59) (55 - 55)  BP: 117/70 (2022 21:59) (117/70 - 117/70)  BP(mean): --  RR: 16 (2022 21:59) (16 - 16)  SpO2: 98% (2022 21:59) (98% - 98%)      Constitutional - NAD, Comfortable, participating in recreation therapy  Chest - Good chest expansion. Breathing comfortably on RA  Cardiovascular - Warm and well perfused; no LE edema  Abdomen - Soft, NTND  Extremities - No calf tenderness   Neurologic Exam -                    Cognitive - Awake and alert     Communication - Fluent, No dysarthria     Motor - Moving extremities spontaneously  Psychiatric - Mood stable, Affect WNL      Recent Labs/Imagin.1   8.84  )-----------( 257      ( 2022 05:45 )             41.4     -    144  |  105  |  13  ----------------------------<  83  3.7   |  28  |  0.92    Ca    9.4      2022 05:45    TPro  7.7  /  Alb  3.8  /  TBili  0.2  /  DBili  x   /  AST  17  /  ALT  35  /  AlkPhos  45  -            Medications:  acetaminophen     Tablet .. 650 milliGRAM(s) Oral every 6 hours PRN  brivaracetam 150 milliGRAM(s) Oral two times a day  Diazepam (Valtoco) 20mg Nasal Spray 10 milliGRAM(s) 10 milliGRAM(s) Both Nostrils once PRN  heparin   Injectable 5000 Unit(s) SubCutaneous every 12 hours  melatonin 3 milliGRAM(s) Oral at bedtime PRN  polyethylene glycol 3350 17 Gram(s) Oral daily  senna 2 Tablet(s) Oral at bedtime PRN   Subjective:  Patient was seen and examined during recreation therapy this AM. No acute overnight events.   Reports improved sleep last night after switching to a different hospital bed. No acute medical issues reported this AM.     ROS:  Had no chills, nausea, vomiting, CP, palpitations, coughing, wheezing, SOB, or abdominal pain. Last BM was on     Therapy and IDT today--engaging, motivated,   good motor strength, some minor deficits with higher executive fxn and sequencing    Physical Exam:  Vital Signs Last 24 Hrs  T(C): 36.6 (2022 21:59), Max: 36.6 (2022 21:59)  T(F): 97.9 (2022 21:59), Max: 97.9 (2022 21:59)  HR: 55 (2022 21:59) (55 - 55)  BP: 117/70 (2022 21:59) (117/70 - 117/70)  BP(mean): --  RR: 16 (2022 21:59) (16 - 16)  SpO2: 98% (2022 21:59) (98% - 98%)      Constitutional - NAD, Comfortable, participating in recreation therapy  Chest - Good chest expansion. Breathing comfortably on RA  Cardiovascular - Warm and well perfused; no LE edema  Abdomen - Soft, NTND  Extremities - No calf tenderness   Neurologic Exam -                    Cognitive - Awake and alert     Communication - Fluent, No dysarthria     Motor - Moving extremities spontaneously  Psychiatric - Mood stable, Affect WNL      Recent Labs/Imagin.1   8.84  )-----------( 257      ( 2022 05:45 )             41.4     -24    144  |  105  |  13  ----------------------------<  83  3.7   |  28  |  0.92    Ca    9.4      2022 05:45    TPro  7.7  /  Alb  3.8  /  TBili  0.2  /  DBili  x   /  AST  17  /  ALT  35  /  AlkPhos  45  -            Medications:  acetaminophen     Tablet .. 650 milliGRAM(s) Oral every 6 hours PRN  brivaracetam 150 milliGRAM(s) Oral two times a day  Diazepam (Valtoco) 20mg Nasal Spray 10 milliGRAM(s) 10 milliGRAM(s) Both Nostrils once PRN  heparin   Injectable 5000 Unit(s) SubCutaneous every 12 hours  melatonin 3 milliGRAM(s) Oral at bedtime PRN  polyethylene glycol 3350 17 Gram(s) Oral daily  senna 2 Tablet(s) Oral at bedtime PRN

## 2022-02-24 NOTE — PROGRESS NOTE ADULT - ATTENDING COMMENTS
Seen and examined with Resident Dr Fenton    22 y/o F, on acute rehab treatment after acute care for recurrent seizures post fall   Left ICA Carotid Ophthalmic Artery Aneurysm. noted while investigating etiologic cause, vEEG unremarkable for seizure activity   Definitive surgical treatment (embolization) deferred due to seizure activity    No further seizures since acute rehab admission 2/23  Engaging in therapy  IDT today noted to have some executive fxn and sequencing deficits, good motor strength  Ambulating functional distance with supervision and no gait assistance use    Lab results--unremarkable    Clinically stable and appropriate  AAO X 3  No speech or motor deficits    Continue PT/OT/SLP addressing deficits noted  DVT ppx and all supportive care to continue  Liaison with Neurosurgery to clarify possible timing for any intervention for the intracranial aneurysm  Est dc as stated during team conference

## 2022-02-25 DIAGNOSIS — K62.5 HEMORRHAGE OF ANUS AND RECTUM: ICD-10-CM

## 2022-02-25 DIAGNOSIS — K92.1 MELENA: ICD-10-CM

## 2022-02-25 DIAGNOSIS — R10.30 LOWER ABDOMINAL PAIN, UNSPECIFIED: ICD-10-CM

## 2022-02-25 PROCEDURE — 99221 1ST HOSP IP/OBS SF/LOW 40: CPT

## 2022-02-25 PROCEDURE — 99233 SBSQ HOSP IP/OBS HIGH 50: CPT

## 2022-02-25 RX ADMIN — POLYETHYLENE GLYCOL 3350 17 GRAM(S): 17 POWDER, FOR SOLUTION ORAL at 12:05

## 2022-02-25 RX ADMIN — BRIVARACETAM 150 MILLIGRAM(S): 25 TABLET, FILM COATED ORAL at 06:20

## 2022-02-25 RX ADMIN — HEPARIN SODIUM 5000 UNIT(S): 5000 INJECTION INTRAVENOUS; SUBCUTANEOUS at 06:21

## 2022-02-25 RX ADMIN — BRIVARACETAM 150 MILLIGRAM(S): 25 TABLET, FILM COATED ORAL at 18:16

## 2022-02-25 NOTE — PROGRESS NOTE ADULT - SUBJECTIVE AND OBJECTIVE BOX
Subjective:  Patient was seen and examined while sitting up in WC at bedside  No acute overnight events. C/o difficulty with falling asleep, but once asleep able to stay asleep.    Endorses mobile nodule/mass to clavicular region and dark stool x 1 year - expresses her concerns because of extensive family history of Ca.  Last time she experienced dark stool + pasquale blood noted when clensing was 4 days ago - denies rectal or abdominal pain    ROS:  Had no chills, nausea, vomiting, CP, palpitations, coughing, wheezing, SOB, or abdominal pain. Last BM was on     good motor strength,  focus on sequencing    Physical Exam:  Vital Signs Last 24 Hrs  T(C): 36.3 (2022 07:30), Max: 36.7 (2022 20:15)  T(F): 97.4 (2022 07:30), Max: 98 (2022 20:15)  HR: 65 (2022 07:30) (65 - 77)  BP: 112/73 (2022 07:30) (112/73 - 117/75)  RR: 16 (2022 07:30) (16 - 16)  SpO2: 100% (2022 07:30) (97% - 100%)      Constitutional - NAD, Comfortable, participating in recreation therapy  Chest - Good chest expansion. Breathing comfortably on RA  Cardiovascular - Warm and well perfused; no LE edema  Abdomen - Soft, NTND  Extremities - No calf tenderness   Neurologic Exam -                    Cognitive - Awake and alert     Communication - Fluent, No dysarthria     Motor - Moving extremities spontaneously  Psychiatric - Mood stable, Affect WNL      Recent Labs/Imagin.1   8.84  )-----------( 257      ( 2022 05:45 )             41.4     02-24    144  |  105  |  13  ----------------------------<  83  3.7   |  28  |  0.92    Ca    9.4      2022 05:45    TPro  7.7  /  Alb  3.8  /  TBili  0.2  /  DBili  x   /  AST  17  /  ALT  35  /  AlkPhos  45  -24      MEDICATIONS  (STANDING):  brivaracetam 150 milliGRAM(s) Oral two times a day  polyethylene glycol 3350 17 Gram(s) Oral daily    MEDICATIONS  (PRN):  acetaminophen     Tablet .. 650 milliGRAM(s) Oral every 6 hours PRN Temp greater or equal to 38.5C (101.3F), Mild Pain (1 - 3)  Diazepam (Valtoco) 20mg Nasal Spray 10 milliGRAM(s) 10 milliGRAM(s) Both Nostrils once PRN seizures that last for more than 5 min  melatonin 3 milliGRAM(s) Oral at bedtime PRN Insomnia  senna 2 Tablet(s) Oral at bedtime PRN Constipation   Subjective:  Patient was seen and examined while sitting up in WC at bedside  No acute overnight events. C/o difficulty with falling asleep, but once asleep able to stay asleep.    Reports mobile nodule/mass on anterior portion of Left clavicular region, and dark stool x 1 year - expresses her concerns because of extensive family history of Ca.  Last time she experienced dark stool + pasquale blood noted when cleaning up was 4 days ago - denies rectal or abdominal pain    ROS:  Had no chills, nausea, vomiting, CP, palpitations, coughing, wheezing, SOB, or abdominal pain. Last BM was on     Therapy--Good motor strength,  focus on sequencing    Physical Exam:  Vital Signs Last 24 Hrs  T(C): 36.3 (2022 07:30), Max: 36.7 (2022 20:15)  T(F): 97.4 (2022 07:30), Max: 98 (2022 20:15)  HR: 65 (2022 07:30) (65 - 77)  BP: 112/73 (2022 07:30) (112/73 - 117/75)  RR: 16 (2022 07:30) (16 - 16)  SpO2: 100% (2022 07:30) (97% - 100%)    EXAM  Constitutional - NAD, Comfortable, participating in recreation therapy  Chest - Good chest expansion. Breathing comfortably on RA  Cardiovascular - Warm and well perfused; no LE edema  Abdomen - Soft, NTND  Extremities - No calf tenderness   Discomfort on palpation over left lateral clavicle, but no obvious lump palpable    Neurologic Exam -                    Cognitive - Awake and alert     Communication - Fluent, No dysarthria     Motor - Moving extremities spontaneously  Psychiatric - Mood stable, Affect WNL    MSK ROM WNL MMT 5/     Recent Labs/Imagin.1   8.84  )-----------( 257      ( 2022 05:45 )             41.4     02-    144  |  105  |  13  ----------------------------<  83  3.7   |  28  |  0.92    Ca    9.4      2022 05:45    TPro  7.7  /  Alb  3.8  /  TBili  0.2  /  DBili  x   /  AST  17  /  ALT  35  /  AlkPhos  45  -      MEDICATIONS  (STANDING):  brivaracetam 150 milliGRAM(s) Oral two times a day  polyethylene glycol 3350 17 Gram(s) Oral daily    MEDICATIONS  (PRN):  acetaminophen     Tablet .. 650 milliGRAM(s) Oral every 6 hours PRN Temp greater or equal to 38.5C (101.3F), Mild Pain (1 - 3)  Diazepam (Valtoco) 20mg Nasal Spray 10 milliGRAM(s) 10 milliGRAM(s) Both Nostrils once PRN seizures that last for more than 5 min  melatonin 3 milliGRAM(s) Oral at bedtime PRN Insomnia  senna 2 Tablet(s) Oral at bedtime PRN Constipation

## 2022-02-25 NOTE — PROGRESS NOTE ADULT - ASSESSMENT
ASSESSMENT/PLAN  This is a 20 YO female with PMH of  epilepsy who presented to [place] ER on 2/5  <6 hours after discharge for seizure activity several days after head trauma when she tripped over mats. On 2/5,  she was awakened by paramedics who told her that her roommates called 911 due to her having seizure activity for approximately 45 seconds. + seizure activities while on neur unit requiring code BA.  CT head and MRI head w/o contrast did not demonstrate acute intracranial findings. MRA head demonstrated an incidental saccular aneurysm measuring 2-3mm on the left supraclinoid/ophthalmic artery branch of the ICA. 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 MRI Brain w/ w/o done showed no evidence of Leptomeningeal enhancement. LP complete by IR on 2/10 without Xanthochromia. Hospital course further c/b seizures which were treated with medication adjustment of which seizures subsisded. Thus far, there are no seizure like activity on vEEG. Patient now with gait Instability, ADL impairments and Functional impairments.      #Seizure  - s/p multiple seizure episode with medication adjustment  - c/w Briviact 150mg BID  - seizure precaution  - commence Comprehensive Rehab Program: PT/OT/ST  - PT: Focused on improving strength, endurance, coordination, balance, functional mobility, and transfers  - OT: Focused on improving strength, fine motor skills, coordination, posture and ADLs.    - ST: to diagnose and treat deficits in swallowing, cognition and communication.  - OP f/u with NSGY     #Left ICA Carotid Ophthalmic Artery Aneurysm  - s/p angiogram. Aneurysm clipping deferred 2/2 multiple seizure    #Seizure  - s/p multiple seizure episode with medication adjustment  - c/w Briviact 150mg BID  - seizure precaution    #Tarry stool x 1 year  - FOBT ordered  - GI consult    #Mobile mass - to left clavicular region  - ultrasound of left clavicular soft tissue to r/o lipoma    #Pain management  - Tylenol PRN    #DVT ppx  - Heparin, SCD, TEDs    #Bowel Regimen  - Senna PRN, miralax daily    #Bladder management  - BS on admission, and q 8 hours (SC if > 400)  - Monitor UO    #FEN  Diet: Regular    #Precaution Fall, Aspiration, Seizure      #Dispo: IDR 02/24/2022  - OT: independent eating; Stefany grooming; supervision for rest of ADLs  - SLP: decreased recall and concentration  - TDD: 3/1 to dorms  - will need to clarify plan regarding aneurysm with neurosurgery  - Neurosurgery f/u 3/9    Outpatient Follow-up (Specialty/Name of physician):  PCP  NEUROLOGY  NSGY ASSESSMENT/PLAN  This is a 22 YO female with PMH of  epilepsy who presented to [place] ER on 2/5  <6 hours after discharge for seizure activity several days after head trauma when she tripped over mats. On 2/5,  she was awakened by paramedics who told her that her roommates called 911 due to her having seizure activity for approximately 45 seconds. + seizure activities while on neur unit requiring code BA.  CT head and MRI head w/o contrast did not demonstrate acute intracranial findings. MRA head demonstrated an incidental saccular aneurysm measuring 2-3mm on the left supraclinoid/ophthalmic artery branch of the ICA. 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 MRI Brain w/ w/o done showed no evidence of Leptomeningeal enhancement. LP complete by IR on 2/10 without Xanthochromia. Hospital course further c/b seizures which were treated with medication adjustment of which seizures subsisded. Thus far, there are no seizure like activity on vEEG. Patient now with gait Instability, ADL impairments and Functional impairments.    * Blood stained stool, * Insomnia  * Discomfort Ant left clavicle     #Seizure  - s/p multiple seizure episode with medication adjustment  - c/w Briviact 150mg BID  - seizure precaution  - commence Comprehensive Rehab Program: PT/OT/ST  - PT: Focused on improving strength, endurance, coordination, balance, functional mobility, and transfers  - OT: Focused on improving strength, fine motor skills, coordination, posture and ADLs.    - ST: to diagnose and treat deficits in swallowing, cognition and communication.  - OP f/u with NSGY     #Left ICA Carotid Ophthalmic Artery Aneurysm  - s/p angiogram. Aneurysm clipping deferred 2/2 multiple seizure    #Seizure  - s/p multiple seizure episode with medication adjustment  - c/w Briviact 150mg BID  - seizure precaution    #Tarry stool x 1 year  - FOBT ordered  - GI consult    # ? Mobile mass - to left clavicular region  - ultrasound of left clavicular soft tissue to r/o lipoma    #Pain management  - Tylenol PRN    #DVT ppx  - Heparin, SCD, TEDs    #Bowel Regimen  - Senna PRN, miralax daily    #Bladder management  - BS on admission, and q 8 hours (SC if > 400)  - Monitor UO    #FEN  Diet: Regular    #Precaution Fall, Aspiration, Seizure      #Dispo: IDR 02/24/2022  - OT: independent eating; Stefany grooming; supervision for rest of ADLs  - SLP: decreased recall and concentration  - TDD: 3/1 to dorms  - will need to clarify plan regarding aneurysm with neurosurgery  - Neurosurgery f/u 3/9    Outpatient Follow-up (Specialty/Name of physician):  PCP  NEUROLOGY  NSGY

## 2022-02-25 NOTE — PROGRESS NOTE ADULT - ATTENDING COMMENTS
Seen and examined with SEN Iniguez    22 y/o F, on acute rehab treatment for recurrent seizures post fall  Incidental Left ICA Carotid Ophthalmic Artery Aneurysm on f/u with neurosurgery  No interval seizures  Reports blood stained stool x 1yr, family hx of cancer, and early deaths,, unsure of specific type of cancer  Left ant clavicular discomfort, no lump palpable on exam      Engaging in therapy, good motor strength, minor deficits with balance and sequencing  Ambulating functional distance with supervision and no gait assistance use    Clinically stable   AAO X 3  No speech or motor deficits    FOBT and GI referral tor/o lower GI bleeding/anal fissure  Continue PT/OT/SLP addressing deficits noted  DVT ppx and all supportive care to continue  F/u with Neurosurgery for management of intracranial aneurysm  Est dc as stated during team conference .

## 2022-02-25 NOTE — CONSULT NOTE ADULT - PROBLEM SELECTOR RECOMMENDATION 9
Discussed endoscopic workup with patient. She is willing to undergo EGD and colonoscopy to further evaluate her GI complaints.  Tentatively plan for procedures early next week if she remains inpatient.   Due to recent seizures patient should be cleared from Neurology standpoint to undergo procedure with sedation.  Monitor CBC, stools.  Will follow.

## 2022-02-26 LAB — OB PNL STL: NEGATIVE — SIGNIFICANT CHANGE UP

## 2022-02-26 PROCEDURE — 99232 SBSQ HOSP IP/OBS MODERATE 35: CPT

## 2022-02-26 RX ADMIN — POLYETHYLENE GLYCOL 3350 17 GRAM(S): 17 POWDER, FOR SOLUTION ORAL at 12:20

## 2022-02-26 RX ADMIN — BRIVARACETAM 150 MILLIGRAM(S): 25 TABLET, FILM COATED ORAL at 06:35

## 2022-02-26 RX ADMIN — BRIVARACETAM 150 MILLIGRAM(S): 25 TABLET, FILM COATED ORAL at 18:57

## 2022-02-26 NOTE — PROGRESS NOTE ADULT - SUBJECTIVE AND OBJECTIVE BOX
Cc: Gait dysfunction due to seizures    HPI: Patient with no new medical issues today.   Sleeping but awakened for exam, denied complaint, later seen on the way back from therapy, alert.   Pain controlled, no chest pain, no N/V, no Fevers/Chills. No other new ROS  Has been tolerating rehabilitation program.    acetaminophen     Tablet .. 650 milliGRAM(s) Oral every 6 hours PRN  brivaracetam 150 milliGRAM(s) Oral two times a day  Diazepam (Valtoco) 20mg Nasal Spray 10 milliGRAM(s) 10 milliGRAM(s) Both Nostrils once PRN  melatonin 3 milliGRAM(s) Oral at bedtime PRN  polyethylene glycol 3350 17 Gram(s) Oral daily  senna 2 Tablet(s) Oral at bedtime PRN      T(C): 36.9 (02-26-22 @ 07:30), Max: 36.9 (02-26-22 @ 07:30)  HR: 56 (02-26-22 @ 07:30) (56 - 56)  BP: 115/73 (02-26-22 @ 07:30) (110/67 - 115/73)  RR: 16 (02-26-22 @ 07:30) (16 - 18)  SpO2: 97% (02-26-22 @ 07:30) (97% - 97%)    In NAD, sleepy but awakens and participates  HEENT- NCAT  Heart- RRR, S1S2  Lungs- no respiratory distress  Abd-  NT  Ext- No edema  Neuro- Exam unchanged      Imp: Patient with diagnosis of seizure, admitted for comprehensive acute rehabilitation.    Plan:  -GI following for blood in stool- recommended for endoscopy and colonoscopy next week if cleared by neurology for procedure  -to follow up with neurosurgery for timing of possible intervention for intracranial aneurysm  - Continue therapies  - DVT prophylaxis-scd  - Skin- Turn q2h, check skin daily  - Continue current medications; patient medically stable.   - Patient is stable to continue current rehabilitation program.    Cc: Gait dysfunction due to seizures    HPI: Patient with no new medical issues today.   Sleeping but awakened for exam, denied complaint, later seen on the way back from therapy, alert.   Pain controlled, no chest pain, no N/V, no Fevers/Chills. No other new ROS  Has been tolerating rehabilitation program.    acetaminophen     Tablet .. 650 milliGRAM(s) Oral every 6 hours PRN  brivaracetam 150 milliGRAM(s) Oral two times a day  Diazepam (Valtoco) 20mg Nasal Spray 10 milliGRAM(s) 10 milliGRAM(s) Both Nostrils once PRN  melatonin 3 milliGRAM(s) Oral at bedtime PRN  polyethylene glycol 3350 17 Gram(s) Oral daily  senna 2 Tablet(s) Oral at bedtime PRN      T(C): 36.9 (02-26-22 @ 07:30), Max: 36.9 (02-26-22 @ 07:30)  HR: 56 (02-26-22 @ 07:30) (56 - 56)  BP: 115/73 (02-26-22 @ 07:30) (110/67 - 115/73)  RR: 16 (02-26-22 @ 07:30) (16 - 18)  SpO2: 97% (02-26-22 @ 07:30) (97% - 97%)    In NAD, sleepy but awakens and participates  HEENT- NCAT  Heart- RRR, S1S2  Lungs- no respiratory distress  Abd-  NT  Ext- No edema  Neuro- moves all extremities      Imp: Patient with diagnosis of seizure, admitted for comprehensive acute rehabilitation.    Plan:  -GI following for blood in stool- recommended for endoscopy and colonoscopy next week if cleared by neurology for procedure  -to follow up with neurosurgery for timing of possible intervention for intracranial aneurysm  - Continue therapies  - DVT prophylaxis-scd  - Skin- Turn q2h, check skin daily  - Continue current medications; patient medically stable.   - Patient is stable to continue current rehabilitation program.

## 2022-02-26 NOTE — CHART NOTE - NSCHARTNOTEFT_GEN_A_CORE
[facility] Plan of Care    REHABILITATION DIAGNOSIS:  Carotid artery aneurysm    COMORBIDITIES/COMPLICATING CONDITIONS IMPACTING REHABILITATION:  HEALTH ISSUES - PROBLEM Dx:  Black tarry stools    Abdominal pain, lower    Bright red blood per rectum    PAST MEDICAL & SURGICAL HISTORY:  History of seizure        Based upon consideration of the patient's impairments, functional status, complicating conditions and any other contributing factors and after information garnered from the assessments of all therapy disciplines involved in treating the patient and other pertinent clinicians:    INTERDISCIPLINARY REHABILITATION INTERVENTIONS:    [ X  ] Transfer Training  [ X  ] Bed Mobility  [ X  ] Therapeutic Exercise  [ X ] Balance/Coordination Exercises  [ X ] Locomotion retraining  [ X  ] Stairs  [  X ] Functional Transfer Training  [   ] Bowel/Bladder program  [   ] Pain Management  [   ] Skin/Wound Care  [   ] Visual/Perceptual Training  [   ] Therapeutic Recreation Activities  [   ] Neuromuscular Re-education  [ X  ] Activities of Daily Living  [   ] Speech Exercise  [   ] Swallowing Exercises  [   ] Vital Stim  [   ] Dietary Supplements  [   ] Calorie Count  [   ] Cognitive Exercises  [   ] Congnitive/Linguistic Treatment  [   ] Behavior Program  [   ] Neuropsych Therapy  [ X  ] Patient/Family Counseling  [ X ] Family Training  [ X  ] Community Re-entry  [   ] Orthotic Evaluation  [   ] Prosthetic Eval/Training    MEDICAL PROGNOSIS:  good    REHAB POTENTIAL:  very good  EXPECTED DAILY THERAPY:         PT: 1.5       OT:1.5       ST: n/a       P&O: n/a    EXPECTED INTENSITY OF PROGRAM:  3 hrs / Day    EXPECTED FREQUENCY OF PROGRAM: 5 Days/ Week    ESTIMATED LOS:  [  ] 5-7 Days  [x  ] 7-10 Days  [  ] 10- 14 Days  [  ] 14- 18 Days  [  ] 18- 21 Days    ESTIMATED DISPOSITION:  [ x ] Home   [  ] Home with Outpatient Therapies  [  ] Home with Home Therapies  [  ] Assisted Living  [  ] Nursing Home  [  ] Long Term Acute Care    INTERDISCIPLINARY FUNCTIONAL OUTCOMES/GOALS:         Gait/Mobility: independent without gait device       Transfers: independent       ADLs: independent       Functional Transfers: independent       Medication Management: independent       Communication: maintain effective communication, effectively direct her care        Cognitive: maintain good cognition       Dysphagia: continue to tolerate regular diet       Bladder continue to maintain normal bladder function       Bowel: maintain regular bowel emptying pattern     Functional Independent Measures: 7  7 = Independent  6 = Modified Independent  5 = Supervision  4 = Minimal Assist/ Contact Guard  3 = Moderate Assistance  2 = Maximum Assistance  1 = Total Assistance  0 = Unable to assess

## 2022-02-26 NOTE — PROGRESS NOTE ADULT - SUBJECTIVE AND OBJECTIVE BOX
HPI:  This is a 22 YO female with known epilepsy who presented to [place] ER on 2/5  <6 hours after discharge for seizure activity several days after head trauma when she tripped over mats. Patient was recently admitted for seizure activity after head trauma and received MRI of brain + cervical cord spinal w/o contrast, EEG that showed no epileptiform activity. She was discharged home on 2/4 evening. On 2/5,  she was awakened by paramedics and was told that her roommates had called 911 due to her having seizure activity for approximately 45 seconds. Episode was staring for 45 seconds and being unresponsive. No fall, no trauma. She is not postictal. Patient was brought to ER for evaluation on evening of 2/4. In ER, patient received head CT w/o Contrast which was with no intracranial pathology, CXR with final read pending but no acute concerns, and was admitted to pediatric neurology service for multi-hour vEEG. In ED, CBC, Chem, LFTs, lactic acid, Utox reassuring. Beta-quant negative. COVID-19 PCR negative.    Upon arrival on the neurology floor, she  was  NAD, conversant, oriented. Patient stated that she remembers leaving the hospital and going to  her Briavact medication and Valtoco rescue, heading back home to her dorm where she instructed her teammates on how to use the rescue Valtoco, and then went to sleep on an air mattress. Patient states that she has been very sleep deprived during the last hospitalization, and that she felt that she needed to get rest. She then remembered having a seizure which felt similar to her previous complex partial seizures, patient did not have loss of consciousness per patient, however, she does not fully recall the circumstances of the event. No tongue biting, no loss of urine. Although patient was told that she had an absence seizure, patient felt that it felt similar to previous complex partial. The seizure felt milder than previous seizures, and patient’s teammates did not administer Valtoco. Patient states that she wanted to stay at home and sleep following the episode but was told by responding authorities to present to ED.       While on the Pediatric Neurology service she had a seizure and was subsequently loaded with Fosphenytoin. However patient never came back to baseline and [facility] was called. CT head and MRI head w/o contrast did not demonstrate acute intracranial findings. MRA head demonstrated an incidental saccular aneurysm measuring 2-3mm on the left supraclinoid/ophthalmic artery branch of the ICA. She was subsequently transferred to [facility] for LP and Angiogram. 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 MRI Brain w/ w/o done showed no evidence of Leptomeningeal enhancement. LP complete by IR on 2/10 without Xanthochromia.     On 2/11/21, she had 3 clinical seizures not captured on VEEG. Loaded with Phenytoin 400mg and continued with 200mg q8. VEEG placed back on without any evidence of seizure activity. Than on 2/12 patient agitated and was placed on Precedex drip. On 2/12/22 Phenytoin level critical and was discontinued. On 2/13 overnight patient had 4 seizure events that were not captured on vEEG. Originally was going to have Aneurysm clipped however due to seizures and thought to be incidental in nature in anyways procedure was delayed. Patient downgraded to General Neurology for VEEG monitoring. She has been down titrated off Briviact in hopes to catch an event to make a definitive diagnosis of Seizure disorder vs PNES (Psychogenic nonepileptic seizure). Thus far, there are no seizure like activity on vEEG.  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 she was placed back on Briviact 150mg BID  with recommendation to f/u with neurology OP. Patient was evaluated by PM&R and therapy for functional deficits, gait/ADL impairments and acute rehabilitation was recommended. Patient was medically optimized for discharge to [facility] IRU on 2/22/22.     (22 Feb 2022 15:37)      Subjective  no new complaints        PAST MEDICAL & SURGICAL HISTORY:  History of seizure        MedsMEDICATIONS  (STANDING):  brivaracetam 150 milliGRAM(s) Oral two times a day  polyethylene glycol 3350 17 Gram(s) Oral daily    MEDICATIONS  (PRN):  acetaminophen     Tablet .. 650 milliGRAM(s) Oral every 6 hours PRN Temp greater or equal to 38.5C (101.3F), Mild Pain (1 - 3)  Diazepam (Valtoco) 20mg Nasal Spray 10 milliGRAM(s) 10 milliGRAM(s) Both Nostrils once PRN seizures that last for more than 5 min  melatonin 3 milliGRAM(s) Oral at bedtime PRN Insomnia  senna 2 Tablet(s) Oral at bedtime PRN Constipation      Vital Signs Last 24 Hrs  T(C): 36.9 (26 Feb 2022 07:30), Max: 36.9 (26 Feb 2022 07:30)  T(F): 98.5 (26 Feb 2022 07:30), Max: 98.5 (26 Feb 2022 07:30)  HR: 56 (26 Feb 2022 07:30) (56 - 56)  BP: 115/73 (26 Feb 2022 07:30) (110/67 - 115/73)  BP(mean): --  RR: 16 (26 Feb 2022 07:30) (16 - 18)  SpO2: 97% (26 Feb 2022 07:30) (97% - 97%)  I&O's Summary      PHYSICAL EXAM:  GENERAL: NAD  NECK: Supple  NERVOUS SYSTEM:  awake and alert  HEART: S1s2 NL , RRR  CHEST/LUNG: Clear to percussion bilaterally  ABDOMEN: Soft, Nontender, Nondistended; Bowel sounds present  EXTREMITIES:  No edema      LABS:              RVP:          Tox:           CAPILLARY BLOOD GLUCOSE          Imaging Personally Reviewed:  [ ] YES  [ ] NO        Care Discussed with Consultants/Other Providers [ x] YES  [ ] NO

## 2022-02-26 NOTE — PROGRESS NOTE ADULT - ASSESSMENT
Left ICA Carotid Aneurysm, s/p angio on 2/8, SZ  Brivaracetam   PT/OT per rehab    Dark stool over 1 year  h/h stable  Doubt chronic bleeding    DVT ppx  Hep SQ

## 2022-02-27 PROCEDURE — 99232 SBSQ HOSP IP/OBS MODERATE 35: CPT

## 2022-02-27 RX ADMIN — BRIVARACETAM 150 MILLIGRAM(S): 25 TABLET, FILM COATED ORAL at 05:56

## 2022-02-27 RX ADMIN — BRIVARACETAM 150 MILLIGRAM(S): 25 TABLET, FILM COATED ORAL at 19:23

## 2022-02-27 NOTE — PROGRESS NOTE ADULT - SUBJECTIVE AND OBJECTIVE BOX
Cc: Gait dysfunction    HPI: Patient with no new medical issues today.     Pain controlled, no chest pain, no N/V, no Fevers/Chills. No other new ROS  Has been tolerating rehabilitation program.    acetaminophen     Tablet .. 650 milliGRAM(s) Oral every 6 hours PRN  brivaracetam 150 milliGRAM(s) Oral two times a day  Diazepam (Valtoco) 20mg Nasal Spray 10 milliGRAM(s) 10 milliGRAM(s) Both Nostrils once PRN  melatonin 3 milliGRAM(s) Oral at bedtime PRN  polyethylene glycol 3350 17 Gram(s) Oral daily  senna 2 Tablet(s) Oral at bedtime PRN      T(C): 36.8 (02-27-22 @ 08:00), Max: 36.8 (02-27-22 @ 08:00)  HR: 64 (02-27-22 @ 08:00) (64 - 77)  BP: 118/83 (02-27-22 @ 08:00) (111/65 - 118/83)  RR: 16 (02-27-22 @ 08:00) (14 - 16)  SpO2: 98% (02-27-22 @ 08:00) (98% - 100%)      In NAD  HEENT- NCAT  Heart- RRR, S1S2  Lungs- no respiratory distress  Abd-  NT  Ext- No edema  Neuro- moves all extremities      Imp: Patient with diagnosis of seizure, admitted for comprehensive acute rehabilitation.    Plan:   - Continue therapies  - DVT prophylaxis- scd  - Skin- Turn q2h, check skin daily  - Continue current medications; patient medically stable.   - Patient is stable to continue current rehabilitation program.

## 2022-02-27 NOTE — PROGRESS NOTE ADULT - SUBJECTIVE AND OBJECTIVE BOX
HPI:  This is a 20 YO female with known epilepsy who presented to [facility] on 2/5  <6 hours after discharge for seizure activity several days after head trauma when she tripped over mats. Patient was recently admitted for seizure activity after head trauma and received MRI of brain + cervical cord spinal w/o contrast, EEG that showed no epileptiform activity. She was discharged home on 2/4 evening. On 2/5,  she was awakened by paramedics and was told that her roommates had called 911 due to her having seizure activity for approximately 45 seconds. Episode was staring for 45 seconds and being unresponsive. No fall, no trauma. She is not postictal. Patient was brought to ER for evaluation on evening of 2/4. In ER, patient received head CT w/o Contrast which was with no intracranial pathology, CXR with final read pending but no acute concerns, and was admitted to pediatric neurology service for multi-hour vEEG. In ED, CBC, Chem, LFTs, lactic acid, Utox reassuring. Beta-quant negative. COVID-19 PCR negative.    Upon arrival on the neurology floor, she  was  NAD, conversant, oriented. Patient stated that she remembers leaving the hospital and going to  her Briavact medication and Valtoco rescue, heading back home to her dorm where she instructed her teammates on how to use the rescue Valtoco, and then went to sleep on an air mattress. Patient states that she has been very sleep deprived during the last hospitalization, and that she felt that she needed to get rest. She then remembered having a seizure which felt similar to her previous complex partial seizures, patient did not have loss of consciousness per patient, however, she does not fully recall the circumstances of the event. No tongue biting, no loss of urine. Although patient was told that she had an absence seizure, patient felt that it felt similar to previous complex partial. The seizure felt milder than previous seizures, and patient’s teammates did not administer Valtoco. Patient states that she wanted to stay at home and sleep following the episode but was told by responding authorities to present to ED.       While on the Pediatric Neurology service she had a seizure and was subsequently loaded with Fosphenytoin. However patient never came back to baseline and CODE BAT was called. CT head and MRI head w/o contrast did not demonstrate acute intracranial findings. MRA head demonstrated an incidental saccular aneurysm measuring 2-3mm on the left supraclinoid/ophthalmic artery branch of the ICA. She was subsequently transferred to [facility] for LP and Angiogram. 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 MRI Brain w/ w/o done showed no evidence of Leptomeningeal enhancement. LP complete by IR on 2/10 without Xanthochromia.     On 2/11/21, she had 3 clinical seizures not captured on VEEG. Loaded with Phenytoin 400mg and continued with 200mg q8. VEEG placed back on without any evidence of seizure activity. Than on 2/12 patient agitated and was placed on Precedex drip. On 2/12/22 Phenytoin level critical and was discontinued. On 2/13 overnight patient had 4 seizure events that were not captured on vEEG. Originally was going to have Aneurysm clipped however due to seizures and thought to be incidental in nature in anyways procedure was delayed. Patient downgraded to General Neurology for VEEG monitoring. She has been down titrated off Briviact in hopes to catch an event to make a definitive diagnosis of Seizure disorder vs PNES (Psychogenic nonepileptic seizure). Thus far, there are no seizure like activity on vEEG.  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 she was placed back on Briviact 150mg BID  with recommendation to f/u with neurology OP. Patient was evaluated by PM&R and therapy for functional deficits, gait/ADL impairments and acute rehabilitation was recommended. Patient was medically optimized for discharge to [facility] IRU on 2/22/22.     (22 Feb 2022 15:37)      Subjective    No new complaints    PAST MEDICAL & SURGICAL HISTORY:  History of seizure        MedsMEDICATIONS  (STANDING):  brivaracetam 150 milliGRAM(s) Oral two times a day  polyethylene glycol 3350 17 Gram(s) Oral daily    MEDICATIONS  (PRN):  acetaminophen     Tablet .. 650 milliGRAM(s) Oral every 6 hours PRN Temp greater or equal to 38.5C (101.3F), Mild Pain (1 - 3)  Diazepam (Valtoco) 20mg Nasal Spray 10 milliGRAM(s) 10 milliGRAM(s) Both Nostrils once PRN seizures that last for more than 5 min  melatonin 3 milliGRAM(s) Oral at bedtime PRN Insomnia  senna 2 Tablet(s) Oral at bedtime PRN Constipation      Vital Signs Last 24 Hrs  T(C): 36.8 (27 Feb 2022 08:00), Max: 36.8 (27 Feb 2022 08:00)  T(F): 98.3 (27 Feb 2022 08:00), Max: 98.3 (27 Feb 2022 08:00)  HR: 64 (27 Feb 2022 08:00) (64 - 77)  BP: 118/83 (27 Feb 2022 08:00) (111/65 - 118/83)  BP(mean): --  RR: 16 (27 Feb 2022 08:00) (14 - 16)  SpO2: 98% (27 Feb 2022 08:00) (98% - 100%)  I&O's Summary      PHYSICAL EXAM:  GENERAL: NAD  NECK: Supple  NERVOUS SYSTEM:  awake and alert  HEART: S1s2 NL , RRR  CHEST/LUNG: Clear to percussion bilaterally  ABDOMEN: Soft, Nontender, Nondistended; Bowel sounds present  EXTREMITIES:  No edema      LABS:              RVP:          Tox:           CAPILLARY BLOOD GLUCOSE          Imaging Personally Reviewed:  [ ] YES  [ ] NO        Care Discussed with Consultants/Other Providers [ x] YES  [ ] NO

## 2022-02-27 NOTE — PROGRESS NOTE ADULT - ASSESSMENT
Left ICA Carotid Aneurysm, s/p angio on 2/8, SZ  Brivaracetam   PT/OT per rehab    Dark stool over 1 year  h/h stable  Guaiac neg  GI consulted by primary team    DVT ppx  Hep SQ

## 2022-02-28 LAB
ALBUMIN SERPL ELPH-MCNC: 3.4 G/DL — SIGNIFICANT CHANGE UP (ref 3.3–5)
ALP SERPL-CCNC: 36 U/L — LOW (ref 40–120)
ALT FLD-CCNC: 29 U/L — SIGNIFICANT CHANGE UP (ref 10–45)
ANION GAP SERPL CALC-SCNC: 10 MMOL/L — SIGNIFICANT CHANGE UP (ref 5–17)
AST SERPL-CCNC: 19 U/L — SIGNIFICANT CHANGE UP (ref 10–40)
BILIRUB SERPL-MCNC: 0.1 MG/DL — LOW (ref 0.2–1.2)
BUN SERPL-MCNC: 12 MG/DL — SIGNIFICANT CHANGE UP (ref 7–23)
CALCIUM SERPL-MCNC: 9 MG/DL — SIGNIFICANT CHANGE UP (ref 8.4–10.5)
CHLORIDE SERPL-SCNC: 105 MMOL/L — SIGNIFICANT CHANGE UP (ref 96–108)
CO2 SERPL-SCNC: 25 MMOL/L — SIGNIFICANT CHANGE UP (ref 22–31)
CREAT SERPL-MCNC: 0.76 MG/DL — SIGNIFICANT CHANGE UP (ref 0.5–1.3)
GLUCOSE BLDC GLUCOMTR-MCNC: 80 MG/DL — SIGNIFICANT CHANGE UP (ref 70–99)
GLUCOSE SERPL-MCNC: 80 MG/DL — SIGNIFICANT CHANGE UP (ref 70–99)
HCT VFR BLD CALC: 39.8 % — SIGNIFICANT CHANGE UP (ref 34.5–45)
HGB BLD-MCNC: 12.6 G/DL — SIGNIFICANT CHANGE UP (ref 11.5–15.5)
MCHC RBC-ENTMCNC: 29.5 PG — SIGNIFICANT CHANGE UP (ref 27–34)
MCHC RBC-ENTMCNC: 31.7 GM/DL — LOW (ref 32–36)
MCV RBC AUTO: 93.2 FL — SIGNIFICANT CHANGE UP (ref 80–100)
NRBC # BLD: 0 /100 WBCS — SIGNIFICANT CHANGE UP (ref 0–0)
PLATELET # BLD AUTO: 196 K/UL — SIGNIFICANT CHANGE UP (ref 150–400)
POTASSIUM SERPL-MCNC: 4.3 MMOL/L — SIGNIFICANT CHANGE UP (ref 3.5–5.3)
POTASSIUM SERPL-SCNC: 4.3 MMOL/L — SIGNIFICANT CHANGE UP (ref 3.5–5.3)
PROT SERPL-MCNC: 6.9 G/DL — SIGNIFICANT CHANGE UP (ref 6–8.3)
RBC # BLD: 4.27 M/UL — SIGNIFICANT CHANGE UP (ref 3.8–5.2)
RBC # FLD: 14.6 % — HIGH (ref 10.3–14.5)
SARS-COV-2 RNA SPEC QL NAA+PROBE: SIGNIFICANT CHANGE UP
SODIUM SERPL-SCNC: 140 MMOL/L — SIGNIFICANT CHANGE UP (ref 135–145)
WBC # BLD: 8.01 K/UL — SIGNIFICANT CHANGE UP (ref 3.8–10.5)
WBC # FLD AUTO: 8.01 K/UL — SIGNIFICANT CHANGE UP (ref 3.8–10.5)

## 2022-02-28 PROCEDURE — 99222 1ST HOSP IP/OBS MODERATE 55: CPT

## 2022-02-28 PROCEDURE — 99232 SBSQ HOSP IP/OBS MODERATE 35: CPT

## 2022-02-28 PROCEDURE — 99233 SBSQ HOSP IP/OBS HIGH 50: CPT

## 2022-02-28 RX ORDER — SOD SULF/SODIUM/NAHCO3/KCL/PEG
1000 SOLUTION, RECONSTITUTED, ORAL ORAL ONCE
Refills: 0 | Status: COMPLETED | OUTPATIENT
Start: 2022-03-01 | End: 2022-03-01

## 2022-02-28 RX ORDER — SODIUM CHLORIDE 9 MG/ML
1000 INJECTION, SOLUTION INTRAVENOUS
Refills: 0 | Status: DISCONTINUED | OUTPATIENT
Start: 2022-02-28 | End: 2022-03-01

## 2022-02-28 RX ORDER — SOD SULF/SODIUM/NAHCO3/KCL/PEG
1000 SOLUTION, RECONSTITUTED, ORAL ORAL ONCE
Refills: 0 | Status: COMPLETED | OUTPATIENT
Start: 2022-02-28 | End: 2022-02-28

## 2022-02-28 RX ADMIN — POLYETHYLENE GLYCOL 3350 17 GRAM(S): 17 POWDER, FOR SOLUTION ORAL at 19:53

## 2022-02-28 RX ADMIN — Medication 3 MILLIGRAM(S): at 22:18

## 2022-02-28 RX ADMIN — BRIVARACETAM 150 MILLIGRAM(S): 25 TABLET, FILM COATED ORAL at 05:57

## 2022-02-28 RX ADMIN — SENNA PLUS 2 TABLET(S): 8.6 TABLET ORAL at 22:18

## 2022-02-28 RX ADMIN — BRIVARACETAM 150 MILLIGRAM(S): 25 TABLET, FILM COATED ORAL at 19:53

## 2022-02-28 NOTE — PROGRESS NOTE ADULT - ATTENDING COMMENTS
Seen with Dr Fenton, rehab resident    Recurrent seizures  with incidental Intracranial aneurysm   Reports insomnia yesterday, night, but was sleeping in the afternoon today  Mild abd discomfort reported, which resolved shortly afterwards    Last seizure episode was during acute hosp care, prior to rehab admission     Engaged well in therapy,  Cognitively intact    Lab results today, no acute abnormality    Continue PT/OT  Due Endoscopy tomorrow with GI team  D/C deferred to 3/2 Seen with Dr Fenton, rehab resident    Recurrent seizures  with incidental Intracranial aneurysm   Reports insomnia yesterday, night, but was sleeping in the afternoon today  Mild abd discomfort reported, which resolved shortly afterwards    Last seizure episode was during acute hosp care, prior to rehab admission     Engaged well in therapy,  Cognitively intact    Lab results today, no acute abnormality  Phone to to patient's mother Ms Mirna Fernando--[phone]  I discussed details of patient's progress in therapy, functional improvement, and plan for Endoscopy tomorrow  She was happy with same and agreed for procedure to go ahead as planned    Continue PT/OT  Due Endoscopy tomorrow with GI team  D/C deferred to 3/2

## 2022-02-28 NOTE — PROGRESS NOTE ADULT - ASSESSMENT
21 year old F PMH epilepsy presented to [facility] ER on 2/5 for seizure several days after fall and head trauma, found to have saccular aneurysm measuring 2-3mm on the left supraclinoid/ophthalmic artery branch of the ICA s/p angio on 2/8 now admitted to  rehab for gait instability and ADL impairment.    #Left ICA Carotid Ophthalmic Artery Aneurysm  #gait instability  #ADL impairment  - s/p angio on 2/8, aneurysm clipping deferred 2/2 multiple seizures   - start comprehensive rehab therapy     #Seizure  - continue with Briviact 150mg BID  - seizure precautions    #Dark stools  - GI following  - Pt needs Neuro clearance prior to EGD/Colonoscopy    #DVT ppx  - Pt ambulating

## 2022-02-28 NOTE — PROGRESS NOTE ADULT - SUBJECTIVE AND OBJECTIVE BOX
Patient is a 21y old  Female who presents with a chief complaint of Seizure (recurrent) disorder (28 Feb 2022 08:43)    Patient seen and examined at bedside. No acute overnight events. Endorses poor sleep over the weekend. Denies pain/discomfort.     ALLERGIES:  No Known Allergies    MEDICATIONS  (STANDING):  brivaracetam 150 milliGRAM(s) Oral two times a day  polyethylene glycol 3350 17 Gram(s) Oral daily    MEDICATIONS  (PRN):  acetaminophen     Tablet .. 650 milliGRAM(s) Oral every 6 hours PRN Temp greater or equal to 38.5C (101.3F), Mild Pain (1 - 3)  Diazepam (Valtoco) 20mg Nasal Spray 10 milliGRAM(s) 10 milliGRAM(s) Both Nostrils once PRN seizures that last for more than 5 min  melatonin 3 milliGRAM(s) Oral at bedtime PRN Insomnia  senna 2 Tablet(s) Oral at bedtime PRN Constipation    Vital Signs Last 24 Hrs  T(F): 97.8 (28 Feb 2022 06:46), Max: 98.1 (27 Feb 2022 21:18)  HR: 80 (28 Feb 2022 06:46) (80 - 86)  BP: 108/64 (28 Feb 2022 06:46) (100/60 - 108/64)  RR: 14 (28 Feb 2022 06:46) (14 - 15)  SpO2: 95% (28 Feb 2022 06:46) (95% - 98%)  I&O's Summary    PHYSICAL EXAM:  General: NAD, A/O x 3  ENT: MMM, no tonsilar exudate  Neck: Supple, No JVD  Lungs: Clear to auscultation bilaterally, no wheezes. Good air entry bilaterally   Cardio: RRR, S1/S2, No murmurs  Abdomen: Soft, Nontender, Nondistended; Bowel sounds present  Extremities: No calf tenderness, No pitting edema    LABS:                        12.6   8.01  )-----------( 196      ( 28 Feb 2022 06:15 )             39.8       02-28    140  |  105  |  12  ----------------------------<  80  4.3   |  25  |  0.76    Ca    9.0      28 Feb 2022 06:15    TPro  6.9  /  Alb  3.4  /  TBili  0.1  /  DBili  x   /  AST  19  /  ALT  29  /  AlkPhos  36  02-28     eGFR if Non African American: 113 mL/min/1.73M2 (02-28-22 @ 06:15)  eGFR if African American: 132 mL/min/1.73M2 (02-28-22 @ 06:15)    COVID-19 PCR: NotDetec (02-22-22 @ 22:50)    RADIOLOGY & ADDITIONAL TESTS:     Care Discussed with Consultants/Other Providers:

## 2022-02-28 NOTE — PROGRESS NOTE ADULT - ASSESSMENT
ASSESSMENT/PLAN  This is a 22 YO female with PMH of  epilepsy who presented to [facility] ER on 2/5  <6 hours after discharge for seizure activity several days after head trauma when she tripped over mats. On 2/5,  she was awakened by paramedics who told her that her roommates called 911 due to her having seizure activity for approximately 45 seconds. + seizure activities while on neur unit requiring code BA.  CT head and MRI head w/o contrast did not demonstrate acute intracranial findings. MRA head demonstrated an incidental saccular aneurysm measuring 2-3mm on the left supraclinoid/ophthalmic artery branch of the ICA. 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 MRI Brain w/ w/o done showed no evidence of Leptomeningeal enhancement. LP complete by IR on 2/10 without Xanthochromia. Hospital course further c/b seizures which were treated with medication adjustment of which seizures subsisded. Thus far, there are no seizure like activity on vEEG. Patient now with gait Instability, ADL impairments and Functional impairments.    * Blood stained stool, * Insomnia  * Discomfort Ant left clavicle     #Seizure  - s/p multiple seizure episode with medication adjustment  - c/w Briviact 150mg BID  - seizure precaution  - commence Comprehensive Rehab Program: PT/OT/ST  - PT: Focused on improving strength, endurance, coordination, balance, functional mobility, and transfers  - OT: Focused on improving strength, fine motor skills, coordination, posture and ADLs.    - ST: to diagnose and treat deficits in swallowing, cognition and communication.  - OP f/u with NSGY     #Left ICA Carotid Ophthalmic Artery Aneurysm  - s/p angiogram. Aneurysm clipping deferred 2/2 multiple seizure    #Seizure  - s/p multiple seizure episode with medication adjustment  - c/w Briviact 150mg BID  - seizure precaution    #Tarry stool x 1 year  - FOBT ordered  - GI consult    # ? Mobile mass - to left clavicular region  - ultrasound of left clavicular soft tissue to r/o lipoma    #Pain management  - Tylenol PRN    #DVT ppx  - Heparin, SCD, TEDs    #Bowel Regimen  - Senna PRN, miralax daily    #Bladder management  - BS on admission, and q 8 hours (SC if > 400)  - Monitor UO    #FEN  Diet: Regular    #Precaution Fall, Aspiration, Seizure      #Dispo: IDR 02/24/2022  - OT: independent eating; Stefany grooming; supervision for rest of ADLs  - SLP: decreased recall and concentration  - TDD: 3/1 to dorms  - will need to clarify plan regarding aneurysm with neurosurgery  - Neurosurgery f/u 3/9    Outpatient Follow-up (Specialty/Name of physician):  PCP  NEUROLOGY  NSGY ASSESSMENT/PLAN  This is a 20 YO female with PMH of  epilepsy who presented to [facility] ER on 2/5  <6 hours after discharge for seizure activity several days after head trauma when she tripped over mats. On 2/5,  she was awakened by paramedics who told her that her roommates called 911 due to her having seizure activity for approximately 45 seconds. + seizure activities while on neur unit requiring code BA.  CT head and MRI head w/o contrast did not demonstrate acute intracranial findings. MRA head demonstrated an incidental saccular aneurysm measuring 2-3mm on the left supraclinoid/ophthalmic artery branch of the ICA. 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 MRI Brain w/ w/o done showed no evidence of Leptomeningeal enhancement. LP complete by IR on 2/10 without Xanthochromia. Hospital course further c/b seizures which were treated with medication adjustment of which seizures subsisded. Thus far, there are no seizure like activity on vEEG. Patient now with gait Instability, ADL impairments and Functional impairments.    * Blood stained stool * Insomnia  * Discomfort Ant left clavicle     #Seizure  - s/p multiple seizure episode with medication adjustment  - c/w Briviact 150mg BID  - seizure precaution  - commence Comprehensive Rehab Program: PT/OT/ST  - PT: Focused on improving strength, endurance, coordination, balance, functional mobility, and transfers  - OT: Focused on improving strength, fine motor skills, coordination, posture and ADLs.    - ST: to diagnose and treat deficits in swallowing, cognition and communication.  - OP f/u with NSGY     #Left ICA Carotid Ophthalmic Artery Aneurysm  - s/p angiogram. Aneurysm clipping deferred 2/2 multiple seizure    #Seizure  - s/p multiple seizure episode with medication adjustment  - c/w Briviact 150mg BID  - seizure precaution    #Tarry stool x 1 year  - FOBT ordered  - GI consult --> plan for EGD with colonoscopy 3/1, if cleared by neurology  - neurology c/s for clearance     # ? Mobile mass - to left clavicular region  - ultrasound of left clavicular soft tissue to r/o lipoma    #Pain management  - Tylenol PRN    #DVT ppx  - Heparin, SCD, TEDs    #Bowel Regimen  - Senna PRN, miralax daily    #Bladder management  - BS on admission, and q 8 hours (SC if > 400)  - Monitor UO    #FEN  Diet: Regular    #Precaution Fall, Aspiration, Seizure      #Dispo: IDR 02/24/2022  - OT: independent eating; Stefany grooming; supervision for rest of ADLs  - SLP: decreased recall and concentration  - TDD: 3/2 to dorms  - will need to clarify plan regarding aneurysm with neurosurgery  - Neurosurgery f/u 3/9    Outpatient Follow-up (Specialty/Name of physician):  PCP  NEUROLOGY  NSGY ASSESSMENT/PLAN  This is a 20 YO female with PMH of  epilepsy who presented to [facility] ER on 2/5  <6 hours after discharge for seizure activity several days after head trauma when she tripped over mats. On 2/5,  she was awakened by paramedics who told her that her roommates called 911 due to her having seizure activity for approximately 45 seconds. + seizure activities while on neur unit requiring code BA.  CT head and MRI head w/o contrast did not demonstrate acute intracranial findings. MRA head demonstrated an incidental saccular aneurysm measuring 2-3mm on the left supraclinoid/ophthalmic artery branch of the ICA. 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 MRI Brain w/ w/o done showed no evidence of Leptomeningeal enhancement. LP complete by IR on 2/10 without Xanthochromia. Hospital course further c/b seizures which were treated with medication adjustment of which seizures subsisded. Thus far, there are no seizure like activity on vEEG. Patient now with gait Instability, ADL impairments and Functional impairments.    * Blood stained stool * Insomnia  * Discomfort Ant left clavicle   * due Endoscopy tomorrow, NPO IVF from midnight, hold AM Lovenox on 3/1    #Seizure  - s/p multiple seizure episode with medication adjustment  - c/w Briviact 150mg BID  - seizure precaution  - commence Comprehensive Rehab Program: PT/OT/ST  - PT: Focused on improving strength, endurance, coordination, balance, functional mobility, and transfers  - OT: Focused on improving strength, fine motor skills, coordination, posture and ADLs.    - ST: to diagnose and treat deficits in swallowing, cognition and communication.  - OP f/u with NSGY     #Left ICA Carotid Ophthalmic Artery Aneurysm  - s/p angiogram. Aneurysm clipping deferred 2/2 multiple seizure    #Seizure  - s/p multiple seizure episode with medication adjustment  - c/w Briviact 150mg BID  - seizure precaution    #Tarry stool x 1 year  - FOBT ordered  - GI consult --> plan for EGD with colonoscopy 3/1, if cleared by neurology  - neurology c/s for clearance     # ? Mobile mass - to left clavicular region  - ultrasound of left clavicular soft tissue to r/o lipoma    #Pain management  - Tylenol PRN    #DVT ppx  - Heparin, SCD, TEDs    #Bowel Regimen  - Senna PRN, miralax daily    #Bladder management  - BS on admission, and q 8 hours (SC if > 400)  - Monitor UO    #FEN  Diet: Regular    #Precaution Fall, Aspiration, Seizure      #Dispo: IDR 02/24/2022  - OT: independent eating; Stefany grooming; supervision for rest of ADLs  - SLP: decreased recall and concentration  - TDD: 3/2 to dorms  - will need to clarify plan regarding aneurysm with neurosurgery  - Neurosurgery f/u 3/9    Outpatient Follow-up (Specialty/Name of physician):  PCP  NEUROLOGY  NSGY

## 2022-02-28 NOTE — CONSULT NOTE ADULT - GAIT/BALANCE
Gait was normal, balance normal. Finger to nose intact bilaterally.  Plantar responses were downgoing bilaterally.

## 2022-02-28 NOTE — PROGRESS NOTE ADULT - SUBJECTIVE AND OBJECTIVE BOX
Subjective:  Patient was seen and examined at the bedside this AM. No acute overnight events.     ROS:  Did not report fever, chills, nausea, vomiting, CP, palpitations, coughing, wheezing, SOB, or abdominal pain. Last BM was on       Physical Exam:  Vital Signs Last 24 Hrs  T(C): 36.6 (2022 06:46), Max: 36.7 (2022 21:18)  T(F): 97.8 (2022 06:46), Max: 98.1 (2022 21:18)  HR: 80 (2022 06:46) (80 - 86)  BP: 108/64 (2022 06:46) (100/60 - 108/64)  BP(mean): --  RR: 14 (2022 06:46) (14 - 15)  SpO2: 95% (2022 06:46) (95% - 98%)      Constitutional - NAD, Comfortable  Chest - Good chest expansion. Breathing comfortably on RA  Cardiovascular - Warm and well perfused; no LE edema  Abdomen - Soft, NTND  Extremities - No calf tenderness   Neurologic Exam -                    Cognitive - Awake and alert; answering questions appropriately; following commands     Communication - Fluent, No dysarthria     Motor -                     LEFT    UE - ShAB 5/5, EF 5/5, EE 5/5, WE 5/5,  5/5                    RIGHT UE - ShAB 5/5, EF 5/5, EE 5/5, WE 5/5,  5/5                    LEFT    LE - HF 5/5, KE 5/5, DF 5/5, PF 5/5                    RIGHT LE - HF 5/5, KE 5/5, DF 5/5, PF 5/5  Psychiatric - Mood stable, Affect WNL      Recent Labs/Imagin.6   8.01  )-----------( 196      ( 2022 06:15 )             39.8         140  |  105  |  12  ----------------------------<  80  4.3   |  25  |  0.76    Ca    9.0      2022 06:15  TPro  6.9  /  Alb  3.4  /  TBili  0.1<L>  /  DBili  x   /  AST  19  /  ALT  29  /  AlkPhos  36<L>          Medications:  acetaminophen     Tablet .. 650 milliGRAM(s) Oral every 6 hours PRN  brivaracetam 150 milliGRAM(s) Oral two times a day  Diazepam (Valtoco) 20mg Nasal Spray 10 milliGRAM(s) 10 milliGRAM(s) Both Nostrils once PRN  melatonin 3 milliGRAM(s) Oral at bedtime PRN  polyethylene glycol 3350 17 Gram(s) Oral daily  senna 2 Tablet(s) Oral at bedtime PRN   Subjective:  Patient was seen and examined at the bedside this AM. No acute overnight events.   Patient informed of possible EGD and colonoscopy tomorrow AM, if cleared by neurology. Patient aware and agrees with plan. Informed proceeding with procedures will delay discharge to Wednesday.   Continues to experience intermittent abdominal discomfort, but did not report dark or bloody stool with most recent BM.     ROS:  Did not report fever, chills, nausea, vomiting, CP, palpitations, coughing, wheezing, SOB, or abdominal pain. Last BM was on       Physical Exam:  Vital Signs Last 24 Hrs  T(C): 36.6 (2022 06:46), Max: 36.7 (2022 21:18)  T(F): 97.8 (2022 06:46), Max: 98.1 (2022 21:18)  HR: 80 (2022 06:46) (80 - 86)  BP: 108/64 (2022 06:46) (100/60 - 108/64)  BP(mean): --  RR: 14 (2022 06:46) (14 - 15)  SpO2: 95% (2022 06:46) (95% - 98%)      Constitutional - NAD, Comfortable  Chest - Good chest expansion. Breathing comfortably on RA  Cardiovascular - Warm and well perfused; no LE edema  Abdomen - Soft, NTND  Neurologic Exam -                    Cognitive - Awake and alert; answering questions appropriately; following commands     Communication - Fluent, No dysarthria     Motor - 5/5 strength throughout  Psychiatric - Mood stable, Affect WNL      Recent Labs/Imagin.6   8.01  )-----------( 196      ( 2022 06:15 )             39.8         140  |  105  |  12  ----------------------------<  80  4.3   |  25  |  0.76    Ca    9.0      2022 06:15  TPro  6.9  /  Alb  3.4  /  TBili  0.1<L>  /  DBili  x   /  AST  19  /  ALT  29  /  AlkPhos  36<L>          Medications:  acetaminophen     Tablet .. 650 milliGRAM(s) Oral every 6 hours PRN  brivaracetam 150 milliGRAM(s) Oral two times a day  Diazepam (Valtoco) 20mg Nasal Spray 10 milliGRAM(s) 10 milliGRAM(s) Both Nostrils once PRN  melatonin 3 milliGRAM(s) Oral at bedtime PRN  polyethylene glycol 3350 17 Gram(s) Oral daily  senna 2 Tablet(s) Oral at bedtime PRN   Subjective:  Patient was seen and examined at the bedside this AM. No acute overnight events.   Patient informed of possible EGD and colonoscopy tomorrow AM, if cleared by neurology. Patient aware and agrees with plan. Informed proceeding with procedures will delay discharge to Wednesday.   Continues to experience intermittent abdominal discomfort, but did not report dark or bloody stool with most recent BM.     ROS:  No fever, chills, nausea, vomiting, CP, palpitations, coughing, wheezing, SOB, or abdominal pain. Last BM was on     Lab result today--no acute abnormality noted    Liaison with providers--Neurology and GI   GI plans endoscopy tomorrow--EGD/Colonoscopy, will be NPO/IVF from midnight     Physical Exam:  Vital Signs Last 24 Hrs  T(C): 36.6 (2022 06:46), Max: 36.7 (2022 21:18)  T(F): 97.8 (2022 06:46), Max: 98.1 (2022 21:18)  HR: 80 (2022 06:46) (80 - 86)  BP: 108/64 (2022 06:46) (100/60 - 108/64)  BP(mean): --  RR: 14 (2022 06:46) (14 - 15)  SpO2: 95% (2022 06:46) (95% - 98%)      Constitutional - NAD, Comfortable  Chest - Good chest expansion. Breathing comfortably on RA  Cardiovascular - Warm and well perfused; no LE edema  Abdomen - Soft, NTND  Neurologic Exam -                 AAO X 3  No cognitive deficits  Motor - 5/5 strength throughout  Psychiatric - Mood stable, Affect WNL      Recent Labs/Imagin.6   8.01  )-----------( 196      ( 2022 06:15 )             39.8         140  |  105  |  12  ----------------------------<  80  4.3   |  25  |  0.76    Ca    9.0      2022 06:15  TPro  6.9  /  Alb  3.4  /  TBili  0.1<L>  /  DBili  x   /  AST  19  /  ALT  29  /  AlkPhos  36<L>          Medications:  acetaminophen     Tablet .. 650 milliGRAM(s) Oral every 6 hours PRN  brivaracetam 150 milliGRAM(s) Oral two times a day  Diazepam (Valtoco) 20mg Nasal Spray 10 milliGRAM(s) 10 milliGRAM(s) Both Nostrils once PRN  melatonin 3 milliGRAM(s) Oral at bedtime PRN  polyethylene glycol 3350 17 Gram(s) Oral daily  senna 2 Tablet(s) Oral at bedtime PRN   Subjective:  Patient was seen and examined at the bedside this AM. No acute overnight events.   Patient informed of possible EGD and colonoscopy tomorrow AM, if cleared by neurology. Patient aware and agrees with plan. Informed proceeding with procedures will delay discharge to Wednesday.   Continues to experience intermittent abdominal discomfort, but did not report dark or bloody stool with most recent BM.     ROS:  No fever, chills, nausea, vomiting, CP, palpitations, coughing, wheezing, SOB, or abdominal pain. Last BM was on     Lab result today--no acute abnormality noted    Liaison with providers--Neurology and GI   GI plans endoscopy tomorrow--EGD/Colonoscopy, will be NPO/IVF from midnight     Discussion with patient's mother Ms Mirna Fernando--[phone]--I discussed progress in therapy and planned procedure tomorrow, she was happy and gave consent   Physical Exam:  Vital Signs Last 24 Hrs  T(C): 36.6 (2022 06:46), Max: 36.7 (2022 21:18)  T(F): 97.8 (2022 06:46), Max: 98.1 (2022 21:18)  HR: 80 (2022 06:46) (80 - 86)  BP: 108/64 (2022 06:46) (100/60 - 108/64)  BP(mean): --  RR: 14 (2022 06:46) (14 - 15)  SpO2: 95% (2022 06:46) (95% - 98%)      Constitutional - NAD, Comfortable  Chest - Good chest expansion. Breathing comfortably on RA  Cardiovascular - Warm and well perfused; no LE edema  Abdomen - Soft, NTND  Neurologic Exam -                 AAO X 3  No cognitive deficits  Motor - 5/5 strength throughout  Psychiatric - Mood stable, Affect WNL      Recent Labs/Imagin.6   8.01  )-----------( 196      ( 2022 06:15 )             39.8         140  |  105  |  12  ----------------------------<  80  4.3   |  25  |  0.76    Ca    9.0      2022 06:15  TPro  6.9  /  Alb  3.4  /  TBili  0.1<L>  /  DBili  x   /  AST  19  /  ALT  29  /  AlkPhos  36<L>          Medications:  acetaminophen     Tablet .. 650 milliGRAM(s) Oral every 6 hours PRN  brivaracetam 150 milliGRAM(s) Oral two times a day  Diazepam (Valtoco) 20mg Nasal Spray 10 milliGRAM(s) 10 milliGRAM(s) Both Nostrils once PRN  melatonin 3 milliGRAM(s) Oral at bedtime PRN  polyethylene glycol 3350 17 Gram(s) Oral daily  senna 2 Tablet(s) Oral at bedtime PRN

## 2022-02-28 NOTE — CONSULT NOTE ADULT - NEUROLOGICAL DETAILS
alert and oriented x 3/responds to verbal commands/sensation intact/cranial nerves intact/normal strength/deep reflexes hypoactive no

## 2022-02-28 NOTE — CONSULT NOTE ADULT - REASON FOR ADMISSION
Seizure (recurrent) disorder
Left ICA Carotid Ophthalmic Artery Aneurysm, s/p angiogram
Seizure (recurrent) disorder

## 2022-02-28 NOTE — CONSULT NOTE ADULT - SUBJECTIVE AND OBJECTIVE BOX
Patient is a 21y old  Female who presents with a chief complaint of Left ICA Carotid Ophthalmic Artery Aneurysm, s/p angiogram (22 Feb 2022 15:37)    HPI:  This is a 20 YO female with known epilepsy who presented to [place] ER on 2/5  <6 hours after discharge for seizure activity several days after head trauma when she tripped over mats. Patient was recently admitted for seizure activity after head trauma and received MRI of brain + cervical cord spinal w/o contrast, EEG that showed no epileptiform activity. She was discharged home on 2/4 evening. On 2/5,  she was awakened by paramedics and was told that her roommates had called 911 due to her having seizure activity for approximately 45 seconds. Episode was staring for 45 seconds and being unresponsive. No fall, no trauma. She is not postictal. Patient was brought to ER for evaluation on evening of 2/4. In ER, patient received head CT w/o Contrast which was with no intracranial pathology, CXR with final read pending but no acute concerns, and was admitted to pediatric neurology service for multi-hour vEEG. In ED, CBC, Chem, LFTs, lactic acid, Utox reassuring. Beta-quant negative. COVID-19 PCR negative.    Upon arrival on the neurology floor, she  was  NAD, conversant, oriented. Patient stated that she remembers leaving the hospital and going to  her Briavact medication and Valtoco rescue, heading back home to her dorm where she instructed her teammates on how to use the rescue Valtoco, and then went to sleep on an air mattress. Patient states that she has been very sleep deprived during the last hospitalization, and that she felt that she needed to get rest. She then remembered having a seizure which felt similar to her previous complex partial seizures, patient did not have loss of consciousness per patient, however, she does not fully recall the circumstances of the event. No tongue biting, no loss of urine. Although patient was told that she had an absence seizure, patient felt that it felt similar to previous complex partial. The seizure felt milder than previous seizures, and patient’s teammates did not administer Valtoco. Patient states that she wanted to stay at home and sleep following the episode but was told by responding authorities to present to ED.       While on the Pediatric Neurology service she had a seizure and was subsequently loaded with Fosphenytoin. However patient never came back to baseline and CODE BAT was called. CT head and MRI head w/o contrast did not demonstrate acute intracranial findings. MRA head demonstrated an incidental saccular aneurysm measuring 2-3mm on the left supraclinoid/ophthalmic artery branch of the ICA. She was subsequently transferred to NCCU for LP and Angiogram. 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 MRI Brain w/ w/o done showed no evidence of Leptomeningeal enhancement. LP complete by IR on 2/10 without Xanthochromia.     On 2/11/21, she had 3 clinical seizures not captured on VEEG. Loaded with Phenytoin 400mg and continued with 200mg q8. VEEG placed back on without any evidence of seizure activity. Than on 2/12 patient agitated and was placed on Precedex drip. On 2/12/22 Phenytoin level critical and was discontinued. On 2/13 overnight patient had 4 seizure events that were not captured on vEEG. Originally was going to have Aneurysm clipped however due to seizures and thought to be incidental in nature in anyways procedure was delayed. Patient downgraded to General Neurology for VEEG monitoring. She has been down titrated off Briviact in hopes to catch an event to make a definitive diagnosis of Seizure disorder vs PNES (Psychogenic nonepileptic seizure). Thus far, there are no seizure like activity on vEEG.  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 she was placed back on Briviact 150mg BID  with recommendation to f/u with neurology OP. Patient was evaluated by PM&R and therapy for functional deficits, gait/ADL impairments and acute rehabilitation was recommended. Patient was medically optimized for discharge to [facility] IRU on 2/22/22.     (22 Feb 2022 15:37)      Subjective History:  Patient seen and examined at bedside. Patient admitted to  rehab after admission to [place] for L ICA opthalmic artery aneurysm s/p angio. Patient without acute complaints at this time. Admits to temporal headache rated 2/10 been present on and off for last few days and blurry vision which is similar since angio but improving. No other acute complaints at this time.    PAST MEDICAL & SURGICAL HISTORY:  History of seizure    SOCIAL HISTORY:  Denies smoking, illicit drug use, EtOH  Goes to [place], plays [employer], lives on campus  Performs all ADLs on her own    FAMILY HISTORY:  no family history of seizures    ALLERGIES:  No Known Allergies    MEDICATIONS  (STANDING):  brivaracetam 150 milliGRAM(s) Oral two times a day  heparin   Injectable 5000 Unit(s) SubCutaneous every 12 hours  polyethylene glycol 3350 17 Gram(s) Oral daily    MEDICATIONS  (PRN):  acetaminophen     Tablet .. 650 milliGRAM(s) Oral every 6 hours PRN Temp greater or equal to 38.5C (101.3F), Mild Pain (1 - 3)  Diazepam (Valtoco) 20mg Nasal Spray 10 milliGRAM(s) 10 milliGRAM(s) Both Nostrils once PRN seizures that last for more than 5 min  melatonin 3 milliGRAM(s) Oral at bedtime PRN Insomnia  senna 2 Tablet(s) Oral at bedtime PRN Constipation    Review of Systems:   CONSTITUTIONAL: denies fever, chills, fatigue, weakness  HEENT: admits to blurred vision, denies sore throat, admits to b/l temporal headaches  CARDIOVASCULAR: denies chest pain, chest pressure, palpitations  RESPIRATORY: denies shortness of breath, sputum production  GASTROINTESTINAL: denies nausea, vomiting, diarrhea, abdominal pain  GENITOURINARY: denies dysuria, discharge  NEUROLOGICAL: denies numbness, headache, focal weakness  MUSCULOSKELETAL: denies new joint pain, muscle aches  HEMATOLOGIC: denies gross bleeding, bruising  PSYCHIATRIC: denies recent changes in anxiety, depression  ENDOCRINOLOGIC: denies sweating, cold or heat intolerance    Vital Signs Last 24 Hrs  T(F): 98.3 (23 Feb 2022 07:51), Max: 98.3 (23 Feb 2022 07:51)  HR: 65 (23 Feb 2022 07:51) (65 - 74)  BP: 132/76 (23 Feb 2022 07:51) (120/72 - 132/76)  RR: 16 (23 Feb 2022 07:51) (16 - 16)  SpO2: 97% (23 Feb 2022 07:51) (97% - 100%)  I&O's Summary    PHYSICAL EXAM:  GENERAL: NAD, well-groomed  HEAD:  Atraumatic, Normocephalic  EYES: PERRL, conjunctiva and sclera clear  ENMT: Moist mucous membranes, Good dentition  NECK: Supple, No JVD  CHEST/LUNG: Clear to auscultation bilaterally, non-labored breathing, good air entry  HEART: RRR; S1/S2, No murmur  ABDOMEN: Soft, Nontender, Nondistended; Bowel sounds present  VASCULAR: Normal pulses, Normal capillary refill  EXTREMITIES: No cyanosis, No edema, 5/5 strength in all extremities  SKIN: Warm, Intact  PSYCH: Normal mood and affect  NERVOUS SYSTEM:  A/O x3, Good concentration; No focal deficits, moves all extremities    LABS:                        12.6   8.80  )-----------( 241      ( 23 Feb 2022 06:00 )             39.6     02-23    141  |  106  |  13  ----------------------------<  88  3.9   |  26  |  0.73    Ca    8.7      23 Feb 2022 06:00    TPro  7.0  /  Alb  3.3  /  TBili  0.2  /  DBili  x   /  AST  16  /  ALT  34  /  AlkPhos  40  02-23    eGFR if Non African American: 118 mL/min/1.73M2 (02-23-22 @ 06:00)  eGFR if : 137 mL/min/1.73M2 (02-23-22 @ 06:00)                                    COVID-19 PCR: NotDetec (02-22-22 @ 22:50)    RADIOLOGY & ADDITIONAL TESTS: reviewed labs and imaging personally    Care Discussed with Consultants/Other Providers: discussed with Dr. Reeves
General Neurology  Consult Note    HPI:  Savanna Kenyon is a 21 year old F with a PMHx of focal epilepsy who presented to [facility] ER on 2/5/22 less than 6 hours after being discharged for breakthrough seizures several days after head trauma. She tripped over mats in the gym and hit her head. She denies LOC. MRI of thebrain and cervical cord without contrast were done and EEG showed no epileptiform activity. She was discharged on 2/4/22. On 2/5/22 EMS was called by er roommates and she was told that she was having seizure activity for about 45 seconds. The patient was unresponsive during this time. There was no fall or trauma noted. When she arrived to the ED on 2/5/22 she was not postictal. CT head without contrast was negative. She was admitted to Pediatric Neurology for vEEG in the EMU.     Patient had reported she had been very sleep deprived during the initial hospitalization, and that she felt that she needed to get rest. She then remembered having a seizure which felt similar to her previous complex partial seizures, patient did not have loss of consciousness per patient, however. She does not fully recall the circumstances of the event. No tongue biting, no loss of urine. Although patient was told that she had an absence seizure, patient felt that it felt similar to previous complex partial. The seizure felt milder than previous seizures, and patient’s teammates did not administer Valtoco. Patient states that she wanted to stay at home and sleep following the episode but was told by responding authorities to present to ED.     While on the Pediatric Neurology service she had a seizure and was subsequently loaded with Fosphenytoin. However patient never came back to baseline and CODE BAT was called. CT head and MRI head w/o contrast did not demonstrate acute intracranial findings. MRA head demonstrated an incidental saccular aneurysm measuring 2-3mm on the left supraclinoid/ophthalmic artery branch of the ICA. She was subsequently transferred to NCCU for LP and Angiogram. 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 MRI Brain w/ w/o done showed no evidence of Leptomeningeal enhancement. LP complete by IR on 2/10 without Xanthochromia.     On 2/11/21, she had 3 clinical seizures not captured on VEEG. Loaded with Phenytoin 400mg and continued with 200mg q8. VEEG placed back on without any evidence of seizure activity. Than on 2/12 patient agitated and was placed on Precedex drip. On 2/12/22 Phenytoin level critical and was discontinued. On 2/13 overnight patient had 4 seizure events that were not captured on vEEG. Originally was going to have Aneurysm clipped however due to seizures and thought to be incidental in nature, procedure was delayed. Patient downgraded to General Neurology for VEEG monitoring. She has been down titrated off Briviact in hopes to catch an event to make a definitive diagnosis of Seizure disorder vs PNES (Psychogenic nonepileptic seizure). Thus far, there are no seizure like activity on vEEG.  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 She was placed back on Briviact 150mg BID  with recommendation to f/u with neurology OP. Patient was evaluated by PM&R and therapy for functional deficits, gait/ADL impairments and acute rehabilitation was recommended. Patient was medically optimized for discharge to [facility] IRU on 2/22/22.    The patient has not had a seizure since 2/13 and at present has no acute complaints. She denies headaches, changes in vision, difficulty speaking, swallowing, numbness/tingling, and weakness. She states she was diagnosed with focal epilepsy when she was 17 years old. She states that she has never bitten her tongue, but has lost urine and a bowel movement during a seizure episode in the past.     Neurology was consulted for clearance to undergo endoscopy for her GI complaints which she described as alternating diarrhea, constipation, with mucus present. 
GI Consult:    21 year old woman admitted to rehab service - history of brain aneurysm and seizure d/o.  Reports leonid she has been having longstanding black tarry stools with intermittent bleeding along with intermittent lower abdominal pain but did not have this ever evaluated in the past. 1-3 BM daily. Currently denies abdominal pain, n/v, weight loss. Of note she has taken iron and Mg in the past for health reasons.  Last seizure activity ~2-3 weeks ago        PMHx/PSHx  History of seizure  saccular aneurysm  Denies surgeries    MEDICATIONS  (STANDING):  brivaracetam 150 milliGRAM(s) Oral two times a day  polyethylene glycol 3350 17 Gram(s) Oral daily    MEDICATIONS  (PRN):  acetaminophen     Tablet .. 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 Father with NHL.    Review of Systems:   GI ROS as per HPI, currently rest of ROS unremarkable.    Vital Signs Last 24 Hrs  Vital Signs Last 24 Hrs  T(C): 36.3 (25 Feb 2022 07:30), Max: 36.7 (24 Feb 2022 20:15)  T(F): 97.4 (25 Feb 2022 07:30), Max: 98 (24 Feb 2022 20:15)  HR: 65 (25 Feb 2022 07:30) (65 - 77)  BP: 112/73 (25 Feb 2022 07:30) (112/73 - 117/75)  BP(mean): --  RR: 16 (25 Feb 2022 07:30) (16 - 16)  SpO2: 100% (25 Feb 2022 07:30) (97% - 100%)    PHYSICAL EXAM:  GEN: NAD  HEENT: NC/AT, anicteric, MMM  NECK: no mass or JVD  CHEST/LUNG: Clear to auscultation  HEART: RRR; S1/S2, No murmur  ABDOMEN: Soft, Nontender, Nondistended; +BS, no palpable mass  EXTREMITIES: No CCE  NEURO: Awake, alert, oriented x3. No focal deficit noted        LABS:                          13.1   8.84  )-----------( 257      ( 24 Feb 2022 05:45 )             41.4     02-24    144  |  105  |  13  ----------------------------<  83  3.7   |  28  |  0.92    Ca    9.4      24 Feb 2022 05:45    TPro  7.7  /  Alb  3.8  /  TBili  0.2  /  DBili  x   /  AST  17  /  ALT  35  /  AlkPhos  45  02-24

## 2022-02-28 NOTE — CONSULT NOTE ADULT - ASSESSMENT
21 year old F PMH epilepsy presented to [place] ER on 2/5 for seizure several days after fall and head trauma, found to have saccular aneurysm measuring 2-3mm on the left supraclinoid/ophthalmic artery branch of the ICA s/p angio on 2/8 now admitted to  rehab for gait instability and ADL impairment.    #Left ICA Carotid Ophthalmic Artery Aneurysm  #gait instability  #ADL impairment  - s/p angio on 2/8, aneurysm clipping deferred 2/2 multiple seizures   - start comprehensive rehab therapy     #Seizure  - continue with Briviact 150mg BID  - seizure precautions    #DVT ppx  - heparin 5000U BID
21 year old woman with hx seizure d/o, recently dx saccular aneurysm, admitted to rehab, reporting black tarry stools with intermittent blood and abdominal pain.
Savanna Kenyon is a 21 year old F with a PMHx of focal epilepsy vs. PNES. The patient had a complicated hospital course with seizures that occurred when not on vEEG, and no seizures while on vEEG, raising the suspicion for psychogenic nonepileptic seizures. The patient has been seizure free since 2/13.    Her neurological examination is normal. At present, she is neurologically cleared to undergo endoscopy.     Continue Brivaracetam 150mg BID.  Seizure precautions.  STAT head CT for any change in mental status  Ativen 1mg IVP for any seizure activity lasting longer than 2 minutes    Remainder of care per primary team.    Thank you for the consult. Please do not hesitate to contact the Neurology team with any questions/concerns.

## 2022-03-01 ENCOUNTER — INPATIENT (INPATIENT)
Facility: HOSPITAL | Age: 22
LOS: 0 days | Discharge: HOME | DRG: 101 | End: 2022-03-02
Attending: STUDENT IN AN ORGANIZED HEALTH CARE EDUCATION/TRAINING PROGRAM | Admitting: STUDENT IN AN ORGANIZED HEALTH CARE EDUCATION/TRAINING PROGRAM
Payer: COMMERCIAL

## 2022-03-01 VITALS
HEART RATE: 62 BPM | DIASTOLIC BLOOD PRESSURE: 70 MMHG | OXYGEN SATURATION: 100 % | TEMPERATURE: 98 F | RESPIRATION RATE: 16 BRPM | SYSTOLIC BLOOD PRESSURE: 114 MMHG

## 2022-03-01 VITALS
SYSTOLIC BLOOD PRESSURE: 139 MMHG | OXYGEN SATURATION: 99 % | RESPIRATION RATE: 15 BRPM | DIASTOLIC BLOOD PRESSURE: 79 MMHG | HEART RATE: 54 BPM | TEMPERATURE: 98 F

## 2022-03-01 DIAGNOSIS — I72.0 ANEURYSM OF CAROTID ARTERY: ICD-10-CM

## 2022-03-01 LAB
ANION GAP SERPL CALC-SCNC: 10 MMOL/L — SIGNIFICANT CHANGE UP (ref 5–17)
BUN SERPL-MCNC: 8 MG/DL — SIGNIFICANT CHANGE UP (ref 7–23)
CALCIUM SERPL-MCNC: 9 MG/DL — SIGNIFICANT CHANGE UP (ref 8.4–10.5)
CHLORIDE SERPL-SCNC: 106 MMOL/L — SIGNIFICANT CHANGE UP (ref 96–108)
CO2 SERPL-SCNC: 26 MMOL/L — SIGNIFICANT CHANGE UP (ref 22–31)
CREAT SERPL-MCNC: 0.81 MG/DL — SIGNIFICANT CHANGE UP (ref 0.5–1.3)
EGFR: 106 ML/MIN/1.73M2 — SIGNIFICANT CHANGE UP
GLUCOSE SERPL-MCNC: 82 MG/DL — SIGNIFICANT CHANGE UP (ref 70–99)
HCG SERPL-ACNC: <1 MIU/ML — SIGNIFICANT CHANGE UP
HCT VFR BLD CALC: 41.2 % — SIGNIFICANT CHANGE UP (ref 34.5–45)
HGB BLD-MCNC: 13.1 G/DL — SIGNIFICANT CHANGE UP (ref 11.5–15.5)
LACTATE SERPL-SCNC: 1.5 MMOL/L — SIGNIFICANT CHANGE UP (ref 0.7–2)
MCHC RBC-ENTMCNC: 30.1 PG — SIGNIFICANT CHANGE UP (ref 27–34)
MCHC RBC-ENTMCNC: 31.8 GM/DL — LOW (ref 32–36)
MCV RBC AUTO: 94.7 FL — SIGNIFICANT CHANGE UP (ref 80–100)
NRBC # BLD: 0 /100 WBCS — SIGNIFICANT CHANGE UP (ref 0–0)
PLATELET # BLD AUTO: 262 K/UL — SIGNIFICANT CHANGE UP (ref 150–400)
POTASSIUM SERPL-MCNC: 3.4 MMOL/L — LOW (ref 3.5–5.3)
POTASSIUM SERPL-SCNC: 3.4 MMOL/L — LOW (ref 3.5–5.3)
RBC # BLD: 4.35 M/UL — SIGNIFICANT CHANGE UP (ref 3.8–5.2)
RBC # FLD: 14.2 % — SIGNIFICANT CHANGE UP (ref 10.3–14.5)
SODIUM SERPL-SCNC: 142 MMOL/L — SIGNIFICANT CHANGE UP (ref 135–145)
WBC # BLD: 10.14 K/UL — SIGNIFICANT CHANGE UP (ref 3.8–10.5)
WBC # FLD AUTO: 10.14 K/UL — SIGNIFICANT CHANGE UP (ref 3.8–10.5)

## 2022-03-01 PROCEDURE — 99233 SBSQ HOSP IP/OBS HIGH 50: CPT

## 2022-03-01 PROCEDURE — 43239 EGD BIOPSY SINGLE/MULTIPLE: CPT

## 2022-03-01 PROCEDURE — 99239 HOSP IP/OBS DSCHRG MGMT >30: CPT

## 2022-03-01 PROCEDURE — 45378 DIAGNOSTIC COLONOSCOPY: CPT

## 2022-03-01 PROCEDURE — 99223 1ST HOSP IP/OBS HIGH 75: CPT

## 2022-03-01 RX ORDER — ACETAMINOPHEN 500 MG
650 TABLET ORAL EVERY 6 HOURS
Refills: 0 | Status: DISCONTINUED | OUTPATIENT
Start: 2022-03-01 | End: 2022-03-02

## 2022-03-01 RX ORDER — LANOLIN ALCOHOL/MO/W.PET/CERES
1 CREAM (GRAM) TOPICAL
Qty: 0 | Refills: 0 | DISCHARGE
Start: 2022-03-01

## 2022-03-01 RX ORDER — POLYETHYLENE GLYCOL 3350 17 G/17G
17 POWDER, FOR SOLUTION ORAL DAILY
Refills: 0 | Status: DISCONTINUED | OUTPATIENT
Start: 2022-03-01 | End: 2022-03-02

## 2022-03-01 RX ORDER — BRIVARACETAM 25 MG/1
150 TABLET, FILM COATED ORAL
Refills: 0 | Status: DISCONTINUED | OUTPATIENT
Start: 2022-03-01 | End: 2022-03-02

## 2022-03-01 RX ORDER — SENNA PLUS 8.6 MG/1
2 TABLET ORAL
Qty: 0 | Refills: 0 | DISCHARGE
Start: 2022-03-01

## 2022-03-01 RX ORDER — ACETAMINOPHEN 500 MG
650 TABLET ORAL EVERY 6 HOURS
Refills: 0 | Status: DISCONTINUED | OUTPATIENT
Start: 2022-03-01 | End: 2022-03-01

## 2022-03-01 RX ORDER — POLYETHYLENE GLYCOL 3350 17 G/17G
17 POWDER, FOR SOLUTION ORAL
Qty: 0 | Refills: 0 | DISCHARGE
Start: 2022-03-01

## 2022-03-01 RX ORDER — ACETAMINOPHEN 500 MG
2 TABLET ORAL
Qty: 0 | Refills: 0 | DISCHARGE
Start: 2022-03-01

## 2022-03-01 RX ADMIN — Medication 1000 MILLILITER(S): at 00:20

## 2022-03-01 RX ADMIN — BRIVARACETAM 150 MILLIGRAM(S): 25 TABLET, FILM COATED ORAL at 06:26

## 2022-03-01 RX ADMIN — Medication 1000 MILLILITER(S): at 06:26

## 2022-03-01 RX ADMIN — BRIVARACETAM 150 MILLIGRAM(S): 25 TABLET, FILM COATED ORAL at 22:08

## 2022-03-01 RX ADMIN — SODIUM CHLORIDE 100 MILLILITER(S): 9 INJECTION, SOLUTION INTRAVENOUS at 00:19

## 2022-03-01 NOTE — PROGRESS NOTE ADULT - REASON FOR ADMISSION
Seizure (recurrent) disorder

## 2022-03-01 NOTE — PATIENT PROFILE ADULT - FALL HARM RISK - HARM RISK INTERVENTIONS

## 2022-03-01 NOTE — H&P ADULT - NSHPPHYSICALEXAM_GEN_ALL_CORE
Vital Signs Last 24 Hrs  T(F): 98.5 (01 Mar 2022 08:33), Max: 98.5 (01 Mar 2022 08:33)  HR: 62 (01 Mar 2022 08:33) (62 - 62)  BP: 114/70 (01 Mar 2022 08:33) (114/70 - 114/70)  RR: 16 (01 Mar 2022 08:33) (16 - 16)  SpO2: 100% (01 Mar 2022 08:33) (100% - 100%)    PHYSICAL EXAM:  GENERAL: NAD, well-groomed, well-developed, sleepy.  HEAD:  Atraumatic, Normocephalic  EYES: EOMI, PERRLA, conjunctiva and sclera clear  ENMT: Moist mucous membranes, Good dentition  NECK: Supple, No JVD  CHEST/LUNG: Clear to auscultation bilaterally; No rales, rhonchi, wheezing, or rubs  HEART: Regular rate and rhythm; S1/S2, No murmurs  ABDOMEN: Soft, Nontender, Nondistended; Bowel sounds present  VASCULAR: Normal pulses, Normal capillary refill  EXTREMITIES: No calf tenderness, No cyanosis, No edema  LYMPH: No lymphadenopathy noted  SKIN: Warm, Intact  PSYCH: Normal mood and affect  NERVOUS SYSTEM:  A/O x3, Good concentration; CN 2-12 intact, No focal deficits

## 2022-03-01 NOTE — H&P ADULT - ATTENDING COMMENTS
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

## 2022-03-01 NOTE — DISCHARGE NOTE PROVIDER - CARE PROVIDER_API CALL
Judson Reeves; MPH)  Surgery  Phys Medicine  [facility]  [address] [place] [address]  Phone: [phone]  Fax: [phone]  Follow Up Time:

## 2022-03-01 NOTE — H&P ADULT - HISTORY OF PRESENT ILLNESS
20 yo female with hx of Seizure, brain aneurysm s/p Colonoscopy and EGD today while in recovery room had a seizure.  Per nurse pt had a 3-5 minute period of "jolting movements and gazing to the left".  Pt.'s vitals were normal.  Pt. was given 1 mg. of Ativan.  She was seen by Neurologist; advised to be admitted for observation post-ictal.  Per discussion with Dr Mckinney, GI the EGD/Colonoscopy were "normal".  Pt. was having procedures due to having "dark stools, r/o GIB".  Pt. c/o of feeling tired.  She does not want to go to medical floor and was very upset she was not going back to her Rehab bed.  She offered no other complaints, no HA, no visual disturbances, no numbness, no weakness. 22 yo female with hx of Seizure, brain aneurysm s/p Colonoscopy and EGD today while in recovery room had a seizure.  Per nurse pt had a 3-5 minute period of "jolting movements and gazing to the left".  No tongue biting, no loss of urine.   Pt.'s vitals were normal.  Pt. was given 1 mg. of Ativan.  She was seen by Neurologist; advised to be admitted for observation post-ictal.  Per discussion with Dr Mckinney, GI the EGD/Colonoscopy were "normal".  Pt. was having procedures due to having "dark stools, r/o GIB".  Pt. c/o of feeling tired.  She does not want to go to medical floor and was very upset she was not going back to her Rehab bed.  She offered no other complaints, no HA, no visual disturbances, no numbness, no weakness.

## 2022-03-01 NOTE — PROVIDER CONTACT NOTE (OTHER) - RECOMMENDATIONS
MD aware  Discuss with day nurse ,have patient break from new Iv placement   Have day nurse pass along to lab team

## 2022-03-01 NOTE — CHART NOTE - NSCHARTNOTEFT_GEN_A_CORE
Called to Endoscopy recovery by GI and Neuro attending post-EGD for seizure activity. Procedure was completed and after patient experienced seizure that was abated with Ativan. Patient is known to experience pseudo-seizures, however, patient would benefit from cardiac monitor post seizure and this is not possible on rehab unit. Patient, at my time of evaluation, was in NAD and without hemodynamic instability. Patient to be transferred to telemetry. Please review neurology note for recommendations and history.

## 2022-03-01 NOTE — H&P ADULT - ASSESSMENT
20 yo F PMH epilepsy presented to [place] ER on 2/5 for seizures after fall and head trauma, found to have saccular aneurysm measuring 2-3mm on the left supraclinoid/ophthalmic artery branch of the ICA s/p angio on 2/8 was admitted to  rehab for gait instability and ADL impairment.  Pt. went for EGD/colonscopy today for hx of dark stools and post-procedure had a breakthrough seizure.  She is admitted to tele for observation.    #Seizure disorder  -cont Briviact 150 mg. bid  -Ativan prn breakthrough seizures  -Neurology note appreciated; cont current meds, check EEG in am  -cont tele monitor  -seizure precautions    #Left ICA Carotid Ophthalmic Artery Aneurysm  #gait instability  - s/p angio on 2/8, aneurysm clipping deferred 2/2 multiple seizures   - PT/OT when stable    #Dark stools  - GI following; had EGD/Colonoscopy today; no active bleeding seen    #DVT ppx  - Pt ambulating    Dispo:  discussed with Rehab Resident, admit to tele for monitoring.    IMPROVE VTE Individual Risk Assessment    RISK                                                                Points    [  ] Previous VTE                                                  3    [  ] Thrombophilia                                               2    [  ] Lower limb paralysis                                      2        (unable to hold up >15 seconds)      [  ] Current Cancer                                              2         (within 6 months)    [  ] Immobilization > 24 hrs                                1    [  ] ICU/CCU stay > 24 hours                              1    [  ] Age > 60                                                      1    IMPROVE VTE Score ___0______    IMPROVE Score 0-1: Low Risk, No VTE prophylaxis required for most patients, encourage ambulation.   IMPROVE Score 2-3: At risk, pharmacologic VTE prophylaxis is indicated for most patients (in the absence of a contraindication)  IMPROVE Score > or = 4: High Risk, pharmacologic VTE prophylaxis is indicated for most patients (in the absence of a contraindication) 20 yo F PMH epilepsy presented to [place] ER on 2/5 for seizures after fall and head trauma, found to have saccular aneurysm measuring 2-3mm on the left supraclinoid/ophthalmic artery branch of the ICA s/p angio on 2/8 was admitted to  rehab for gait instability and ADL impairment.  Pt. went for EGD/colonscopy today for hx of dark stools and post-procedure had a breakthrough seizure.  She is admitted to tele for observation.    #Seizure disorder vs. Psychogenic non-epileptical disorder  -cont Briviact 150 mg. bid  -Ativan prn breakthrough seizures  -Neurology note appreciated; cont current meds, check EEG in am  -cont tele monitor  -seizure precautions    #Left ICA Carotid Ophthalmic Artery Aneurysm  #gait instability  - s/p angio on 2/8, aneurysm clipping deferred 2/2 multiple seizures   - PT/OT when stable    #Dark stools  - GI following; had EGD/Colonoscopy today; no active bleeding seen    #DVT ppx  - Pt ambulating    Dispo:  discussed with Rehab Resident, admit to tele for monitoring.    IMPROVE VTE Individual Risk Assessment    RISK                                                                Points    [  ] Previous VTE                                                  3    [  ] Thrombophilia                                               2    [  ] Lower limb paralysis                                      2        (unable to hold up >15 seconds)      [  ] Current Cancer                                              2         (within 6 months)    [  ] Immobilization > 24 hrs                                1    [  ] ICU/CCU stay > 24 hours                              1    [  ] Age > 60                                                      1    IMPROVE VTE Score ___0______    IMPROVE Score 0-1: Low Risk, No VTE prophylaxis required for most patients, encourage ambulation.   IMPROVE Score 2-3: At risk, pharmacologic VTE prophylaxis is indicated for most patients (in the absence of a contraindication)  IMPROVE Score > or = 4: High Risk, pharmacologic VTE prophylaxis is indicated for most patients (in the absence of a contraindication) 20 yo F PMH epilepsy presented to [facility] ER on 2/5 for seizures after fall and head trauma, found to have saccular aneurysm measuring 2-3mm on the left supraclinoid/ophthalmic artery branch of the ICA s/p angio on 2/8 was admitted to  rehab for gait instability and ADL impairment.  Pt. went for EGD/colonscopy today for hx of dark stools and post-procedure had a breakthrough seizure.  She is admitted to tele for observation.    #Seizure disorder vs. Psychogenic non-epileptic seizure disorder  -cont Briviact 150 mg. bid  -Ativan prn breakthrough seizures  -Neurology note appreciated; cont current meds, check EEG in am  -cont tele monitor  -seizure precautions    #Left ICA Carotid Ophthalmic Artery Aneurysm  #gait instability  - s/p angio on 2/8, aneurysm clipping deferred 2/2 multiple seizures   - PT/OT when stable    #Dark stools  - GI following; had EGD/Colonoscopy today; no active bleeding seen    #DVT ppx  - Pt ambulating    Dispo:  discussed with Rehab Resident, admit to tele for monitoring.    IMPROVE VTE Individual Risk Assessment    RISK                                                                Points    [  ] Previous VTE                                                  3    [  ] Thrombophilia                                               2    [  ] Lower limb paralysis                                      2        (unable to hold up >15 seconds)      [  ] Current Cancer                                              2         (within 6 months)    [  ] Immobilization > 24 hrs                                1    [  ] ICU/CCU stay > 24 hours                              1    [  ] Age > 60                                                      1    IMPROVE VTE Score ___0______    IMPROVE Score 0-1: Low Risk, No VTE prophylaxis required for most patients, encourage ambulation.   IMPROVE Score 2-3: At risk, pharmacologic VTE prophylaxis is indicated for most patients (in the absence of a contraindication)  IMPROVE Score > or = 4: High Risk, pharmacologic VTE prophylaxis is indicated for most patients (in the absence of a contraindication)

## 2022-03-01 NOTE — PROVIDER CONTACT NOTE (OTHER) - SITUATION
Patient IV line 20 gauge right AC  placed  by staff, for IVF  Infiltrated upon assessment  , removed immediately  Patient felt no pain , minor discomfort

## 2022-03-01 NOTE — H&P ADULT - NSHPLABSRESULTS_GEN_ALL_CORE
.                            12.6   8.01  )-----------( 196      ( 28 Feb 2022 06:15 )             39.8       02-28    140  |  105  |  12  ----------------------------<  80  4.3   |  25  |  0.76    Ca    9.0      28 Feb 2022 06:15    TPro  6.9  /  Alb  3.4  /  TBili  0.1  /  DBili  x   /  AST  19  /  ALT  29  /  AlkPhos  36  02-28 tenderness

## 2022-03-01 NOTE — PROGRESS NOTE ADULT - ASSESSMENT
ASSESSMENT/PLAN  This is a 20 YO female with PMH of  epilepsy who presented to [facility] ER on 2/5  <6 hours after discharge for seizure activity several days after head trauma when she tripped over mats. On 2/5,  she was awakened by paramedics who told her that her roommates called 911 due to her having seizure activity for approximately 45 seconds. + seizure activities while on neur unit requiring code BA.  CT head and MRI head w/o contrast did not demonstrate acute intracranial findings. MRA head demonstrated an incidental saccular aneurysm measuring 2-3mm on the left supraclinoid/ophthalmic artery branch of the ICA. 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 MRI Brain w/ w/o done showed no evidence of Leptomeningeal enhancement. LP complete by IR on 2/10 without Xanthochromia. Hospital course further c/b seizures which were treated with medication adjustment of which seizures subsisded. Thus far, there are no seizure like activity on vEEG. Patient now with gait Instability, ADL impairments and Functional impairments.    * Blood stained stool * Insomnia  * Discomfort Ant left clavicle   * due Endoscopy tomorrow, NPO IVF from midnight, hold AM Lovenox on 3/1    #Seizure  - s/p multiple seizure episode with medication adjustment  - c/w Briviact 150mg BID  - seizure precaution  - commence Comprehensive Rehab Program: PT/OT/ST  - PT: Focused on improving strength, endurance, coordination, balance, functional mobility, and transfers  - OT: Focused on improving strength, fine motor skills, coordination, posture and ADLs.    - ST: to diagnose and treat deficits in swallowing, cognition and communication.  - OP f/u with NSGY     #Left ICA Carotid Ophthalmic Artery Aneurysm  - s/p angiogram. Aneurysm clipping deferred 2/2 multiple seizure    #Seizure  - s/p multiple seizure episode with medication adjustment  - c/w Briviact 150mg BID  - seizure precaution    #Tarry stool x 1 year  - FOBT ordered  - GI consult --> plan for EGD with colonoscopy 3/1, if cleared by neurology  - neurology c/s for clearance     # ? Mobile mass - to left clavicular region  - ultrasound of left clavicular soft tissue to r/o lipoma    #Pain management  - Tylenol PRN    #DVT ppx  - Heparin, SCD, TEDs    #Bowel Regimen  - Senna PRN, miralax daily    #Bladder management  - BS on admission, and q 8 hours (SC if > 400)  - Monitor UO    #FEN  Diet: Regular    #Precaution Fall, Aspiration, Seizure      #Dispo: IDR 02/24/2022  - OT: independent eating; Stefany grooming; supervision for rest of ADLs  - SLP: decreased recall and concentration  - TDD: 3/2 to dorms  - will need to clarify plan regarding aneurysm with neurosurgery  - Neurosurgery f/u 3/9    Outpatient Follow-up (Specialty/Name of physician):  PCP  NEUROLOGY  NSGY ASSESSMENT/PLAN  This is a 22 YO female with PMH of  epilepsy who presented to [facility] ER on 2/5  <6 hours after discharge for seizure activity several days after head trauma when she tripped over mats. On 2/5,  she was awakened by paramedics who told her that her roommates called 911 due to her having seizure activity for approximately 45 seconds. + seizure activities while on neur unit requiring code BA.  CT head and MRI head w/o contrast did not demonstrate acute intracranial findings. MRA head demonstrated an incidental saccular aneurysm measuring 2-3mm on the left supraclinoid/ophthalmic artery branch of the ICA. 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 MRI Brain w/ w/o done showed no evidence of Leptomeningeal enhancement. LP complete by IR on 2/10 without Xanthochromia. Hospital course further c/b seizures which were treated with medication adjustment of which seizures subsisded. Thus far, there are no seizure like activity on vEEG. Patient now with gait Instability, ADL impairments and Functional impairments.    * Discomfort Ant left clavicle   * due Endoscopy tomorrow, NPO IVF from midnight, due endoscopy today for hx of bloody stool    #Seizure  - s/p multiple seizure episode with medication adjustment  - c/w Briviact 150mg BID  - seizure precaution  - commence Comprehensive Rehab Program: PT/OT/ST  - PT: Focused on improving strength, endurance, coordination, balance, functional mobility, and transfers  - OT: Focused on improving strength, fine motor skills, coordination, posture and ADLs.    - ST: to diagnose and treat deficits in swallowing, cognition and communication.  - OP f/u with NSGY     #Left ICA Carotid Ophthalmic Artery Aneurysm  - s/p angiogram. Aneurysm clipping deferred 2/2 multiple seizure    #Seizure  - s/p multiple seizure episode with medication adjustment  - c/w Briviact 150mg BID  - seizure precaution    #Tarry stool x 1 year  - FOBT ordered  - GI consult --> due EGD with colonoscopy 3/1,    # ? Mobile mass - to left clavicular region  - ultrasound of left clavicular soft tissue to r/o lipoma    #Pain management  - Tylenol PRN    #DVT ppx  - Heparin, SCD, TEDs    #Bowel Regimen  - Senna PRN, miralax daily    #Bladder management  - BS on admission, and q 8 hours (SC if > 400)  - Monitor UO    #FEN  Diet: Regular    #Precaution Fall, Aspiration, Seizure      #Dispo: IDR 02/24/2022  - OT: independent eating; Stefany grooming; supervision for rest of ADLs  - SLP: decreased recall and concentration  - TDD: 3/2 to home  - will need to clarify plan regarding aneurysm with neurosurgery  - Neurosurgery f/u 3/9    Outpatient Follow-up (Specialty/Name of physician):  PCP  NEUROLOGY  NSGY

## 2022-03-01 NOTE — PROGRESS NOTE ADULT - SUBJECTIVE AND OBJECTIVE BOX
Neurology Progress Note    Subjective & Objective: Seen by Dr Khan yesterday.  S/P colonoscopy and had suspected seizure described as yes deviated to one side and random bilateral movements and reduced response.  Given Ativan 1 mg. Chart reviewed.  No tongue biting incontinence or tonic clonic movements.     Now lethargic seems sedated post colonoscopy and Ativan oriented dysarthric follows commands.  CNs intact and motor moves all 4 limbs no seizure activity or focality.  DTRs equal and toes down.      Lab:  02-28    140  |  105  |  12  ----------------------------<  80  4.3   |  25  |  0.76    Ca    9.0      28 Feb 2022 06:15    TPro  6.9  /  Alb  3.4  /  TBili  0.1<L>  /  DBili  x   /  AST  19  /  ALT  29  /  AlkPhos  36<L>  02-28    LIVER FUNCTIONS - ( 28 Feb 2022 06:15 )  Alb: 3.4 g/dL / Pro: 6.9 g/dL / ALK PHOS: 36 U/L / ALT: 29 U/L / AST: 19 U/L / GGT: x                                   12.6   8.01  )-----------( 196      ( 28 Feb 2022 06:15 )             39.8             MEDICATIONS  (STANDING):  brivaracetam 150 milliGRAM(s) Oral two times a day  dextrose 5% + sodium chloride 0.45%. 1000 milliLiter(s) (100 mL/Hr) IV Continuous <Continuous>  polyethylene glycol 3350 17 Gram(s) Oral daily    MEDICATIONS  (PRN):  acetaminophen     Tablet .. 650 milliGRAM(s) Oral every 6 hours PRN Temp greater or equal to 38.5C (101.3F), Mild Pain (1 - 3)  Diazepam (Valtoco) 20mg Nasal Spray 10 milliGRAM(s) 10 milliGRAM(s) Both Nostrils once PRN seizures that last for more than 5 min  melatonin 3 milliGRAM(s) Oral at bedtime PRN Insomnia  senna 2 Tablet(s) Oral at bedtime PRN Constipation

## 2022-03-01 NOTE — PROGRESS NOTE ADULT - SUBJECTIVE AND OBJECTIVE BOX
Subjective:  Patient was seen and examined at the bedside this AM. No acute overnight events.     ROS:  Did not report fever, chills, nausea, vomiting, CP, palpitations, coughing, wheezing, SOB, or abdominal pain. Last BM was on       Physical Exam:  Vital Signs Last 24 Hrs  T(C): 36.9 (01 Mar 2022 08:33), Max: 36.9 (01 Mar 2022 08:33)  T(F): 98.5 (01 Mar 2022 08:33), Max: 98.5 (01 Mar 2022 08:33)  HR: 62 (01 Mar 2022 08:33) (62 - 73)  BP: 114/70 (01 Mar 2022 08:33) (114/70 - 117/78)  BP(mean): --  RR: 16 (01 Mar 2022 08:33) (16 - 16)  SpO2: 100% (01 Mar 2022 08:33) (100% - 100%)      Constitutional - NAD, Comfortable  Chest - Good chest expansion. Breathing comfortably on RA  Cardiovascular - Warm and well perfused; no LE edema  Abdomen - Soft, NTND  Neurologic Exam -                 AAO X 3  No cognitive deficits  Motor - 5/5 strength throughout  Psychiatric - Mood stable, Affect WNLWNL      Recent Labs/Imagin.6   8.01  )-----------( 196      ( 2022 06:15 )             39.8         140  |  105  |  12  ----------------------------<  80  4.3   |  25  |  0.76    Ca    9.0      2022 06:15  TPro  6.9  /  Alb  3.4  /  TBili  0.1<L>  /  DBili  x   /  AST  19  /  ALT  29  /  AlkPhos  36<L>      POCT Blood Glucose.: 80 mg/dL (- @ 16:04)      Medications:  acetaminophen     Tablet .. 650 milliGRAM(s) Oral every 6 hours PRN  brivaracetam 150 milliGRAM(s) Oral two times a day  dextrose 5% + sodium chloride 0.45%. 1000 milliLiter(s) IV Continuous <Continuous>  Diazepam (Valtoco) 20mg Nasal Spray 10 milliGRAM(s) 10 milliGRAM(s) Both Nostrils once PRN  melatonin 3 milliGRAM(s) Oral at bedtime PRN  polyethylene glycol 3350 17 Gram(s) Oral daily  senna 2 Tablet(s) Oral at bedtime PRN   Subjective:  Patient was seen and examined at the bedside this AM. No acute overnight events.  NPO/IVF since midnight due Endoscopy 2pm today   No interval seizures    Neurology cleared patient for endoscopy  ROS:  No fever, chills, nausea, vomiting, CP, palpitations, coughing, wheezing, SOB, or abdominal pain. Last BM was on     Physical Exam:  Vital Signs Last 24 Hrs  T(C): 36.9 (01 Mar 2022 08:33), Max: 36.9 (01 Mar 2022 08:33)  T(F): 98.5 (01 Mar 2022 08:33), Max: 98.5 (01 Mar 2022 08:33)  HR: 62 (01 Mar 2022 08:33) (62 - 73)  BP: 114/70 (01 Mar 2022 08:33) (114/70 - 117/78)  RR: 16 (01 Mar 2022 08:33) (16 - 16)  SpO2: 100% (01 Mar 2022 08:33) (100% - 100%)    Exam  Constitutional - NAD, Comfortable  Chest - Clear  Cardiovascular - Warm and well perfused; no LE edema  Abdomen - Soft, NTND    Neurologic Exam -                 AAO X 3  No cognitive deficits  Motor - 5/5 strength throughout  Psychiatric - Mood stable, Affect WNLWNL      Recent Labs/Imagin.6   8.01  )-----------( 196      ( 2022 06:15 )             39.8         140  |  105  |  12  ----------------------------<  80  4.3   |  25  |  0.76    Ca    9.0      2022 06:15  TPro  6.9  /  Alb  3.4  /  TBili  0.1<L>  /  DBili  x   /  AST  19  /  ALT  29  /  AlkPhos  36<L>      POCT Blood Glucose.: 80 mg/dL (22 @ 16:04)      Medications:  acetaminophen     Tablet .. 650 milliGRAM(s) Oral every 6 hours PRN  brivaracetam 150 milliGRAM(s) Oral two times a day  dextrose 5% + sodium chloride 0.45%. 1000 milliLiter(s) IV Continuous <Continuous>  Diazepam (Valtoco) 20mg Nasal Spray 10 milliGRAM(s) 10 milliGRAM(s) Both Nostrils once PRN  melatonin 3 milliGRAM(s) Oral at bedtime PRN  polyethylene glycol 3350 17 Gram(s) Oral daily  senna 2 Tablet(s) Oral at bedtime PRN

## 2022-03-01 NOTE — H&P ADULT - NSICDXPASTMEDICALHX_GEN_ALL_CORE_FT
PAST MEDICAL HISTORY:  History of seizure      PAST MEDICAL HISTORY:  History of seizure     Saccular aneurysm

## 2022-03-01 NOTE — PROGRESS NOTE ADULT - ASSESSMENT
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 The patient had a complicated hospital course with seizures that occurred when not on vEEG, and no seizures while on vEEG, raising the suspicion for psychogenic nonepileptic seizures. The patient has been seizure free since 2/13. She is now s/p an event post colonoscopy that I suspect is a non epileptogenic seizure.  S/P Ativan and sedated but exam is unremarkable and no seizure stigmata observed.    REC: observe overnight in CCU or telemetry, continue brivaracetam , EEG in AM, neuro f/u.

## 2022-03-01 NOTE — PATIENT PROFILE ADULT - FUNCTIONAL ASSESSMENT - BASIC MOBILITY 4.
----- Message from Marcus Cabello [phone]  Additional Information:        4 = No assist / stand by assistance

## 2022-03-01 NOTE — PROVIDER CONTACT NOTE (OTHER) - ACTION/TREATMENT ORDERED:
MD aware  Discuss with day nurse ,have patient take break from the fluids , monitor site,  Have day nurse pass along to lab team, for new IV placement .

## 2022-03-01 NOTE — DISCHARGE NOTE PROVIDER - HOSPITAL COURSE
This is a 22 YO female with known epilepsy who presented to [place] ER on 2/5  <6 hours after discharge for seizure activity several days after head trauma when she tripped over mats. Patient was recently admitted for seizure activity after head trauma and received MRI of brain + cervical cord spinal w/o contrast, EEG that showed no epileptiform activity. She was discharged home on 2/4 evening. On 2/5,  she was awakened by paramedics and was told that her roommates had called 911 due to her having seizure activity for approximately 45 seconds. Episode was staring for 45 seconds and being unresponsive. No fall, no trauma. She is not postictal. Patient was brought to ER for evaluation on evening of 2/4. In ER, patient received head CT w/o Contrast which was with no intracranial pathology, CXR with final read pending but no acute concerns, and was admitted to pediatric neurology service for multi-hour vEEG. In ED, CBC, Chem, LFTs, lactic acid, Utox reassuring. Beta-quant negative. COVID-19 PCR negative.    Upon arrival on the neurology floor, she  was  NAD, conversant, oriented. Patient stated that she remembers leaving the hospital and going to  her Briavact medication and Valtoco rescue, heading back home to her dorm where she instructed her teammates on how to use the rescue Valtoco, and then went to sleep on an air mattress. Patient states that she has been very sleep deprived during the last hospitalization, and that she felt that she needed to get rest. She then remembered having a seizure which felt similar to her previous complex partial seizures, patient did not have loss of consciousness per patient, however, she does not fully recall the circumstances of the event. No tongue biting, no loss of urine. Although patient was told that she had an absence seizure, patient felt that it felt similar to previous complex partial. The seizure felt milder than previous seizures, and patient’s teammates did not administer Valtoco. Patient states that she wanted to stay at home and sleep following the episode but was told by responding authorities to present to ED.     While on the Pediatric Neurology service she had a seizure and was subsequently loaded with Fosphenytoin. However patient never came back to baseline and CODE BAT was called. CT head and MRI head w/o contrast did not demonstrate acute intracranial findings. MRA head demonstrated an incidental saccular aneurysm measuring 2-3mm on the left supraclinoid/ophthalmic artery branch of the ICA. She was subsequently transferred to NCCU for LP and Angiogram. 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 MRI Brain w/ w/o done showed no evidence of Leptomeningeal enhancement. LP complete by IR on 2/10 without Xanthochromia.     On 2/11/21, she had 3 clinical seizures not captured on VEEG. Loaded with Phenytoin 400mg and continued with 200mg q8. VEEG placed back on without any evidence of seizure activity. Than on 2/12 patient agitated and was placed on Precedex drip. On 2/12/22 Phenytoin level critical and was discontinued. On 2/13 overnight patient had 4 seizure events that were not captured on vEEG. Originally was going to have Aneurysm clipped however due to seizures and thought to be incidental in nature in anyways procedure was delayed. Patient downgraded to General Neurology for VEEG monitoring. She has been down titrated off Briviact in hopes to catch an event to make a definitive diagnosis of Seizure disorder vs PNES (Psychogenic nonepileptic seizure). Thus far, there are no seizure like activity on vEEG.  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 she was placed back on Briviact 150mg BID  with recommendation to f/u with neurology OP. Patient was evaluated by PM&R and therapy for functional deficits, gait/ADL impairments and acute rehabilitation was recommended. Patient was medically optimized for discharge to [facility] IRU on 2/22/22. Patient was stable upon rehab admission to  Inpatient Rehabilitation Facility. Admitted with gait instabilty, ADL, and functional impairments.     Rehab Course significant for reports of intermittent dark stool with abdominal pain and left clavicular mobile mass. Gastroenterology was consulted and patient underwent EGD/colonoscopy on 3/1 for further evaluation. Patient tolerated procedure well, with no acute issues afterwards. Recommend outpatient US of left clavicular mass for further workup. All other medical co-morbidities were stable. Patient tolerated course of inpatient PT/OT/SLP rehab with significant functional improvements and met rehab goals prior to discharge. Patient was medically cleared on 03/02/2022 for discharged to home.     Patient's functional status as of last IDT rounds prior to discharge is, as follows:  #Dispo: IDR 02/24/2022  - OT: independent eating; Stefany grooming; supervision for rest of ADLs  - SLP: decreased recall and concentration    Patient will follow up with the following:   Neurosurgery; has appointment 3/9  Neurology  PCP This is a 22 YO female with known epilepsy who presented to [place] ER on 2/5  <6 hours after discharge for seizure activity several days after head trauma when she tripped over mats. Patient was recently admitted for seizure activity after head trauma and received MRI of brain + cervical cord spinal w/o contrast, EEG that showed no epileptiform activity. She was discharged home on 2/4 evening. On 2/5,  she was awakened by paramedics and was told that her roommates had called 911 due to her having seizure activity for approximately 45 seconds. Episode was staring for 45 seconds and being unresponsive. No fall, no trauma. She is not postictal. Patient was brought to ER for evaluation on evening of 2/4. In ER, patient received head CT w/o Contrast which was with no intracranial pathology, CXR with final read pending but no acute concerns, and was admitted to pediatric neurology service for multi-hour vEEG. In ED, CBC, Chem, LFTs, lactic acid, Utox reassuring. Beta-quant negative. COVID-19 PCR negative.    Upon arrival on the neurology floor, she  was  NAD, conversant, oriented. Patient stated that she remembers leaving the hospital and going to  her Briavact medication and Valtoco rescue, heading back home to her dorm where she instructed her teammates on how to use the rescue Valtoco, and then went to sleep on an air mattress. Patient states that she has been very sleep deprived during the last hospitalization, and that she felt that she needed to get rest. She then remembered having a seizure which felt similar to her previous complex partial seizures, patient did not have loss of consciousness per patient, however, she does not fully recall the circumstances of the event. No tongue biting, no loss of urine. Although patient was told that she had an absence seizure, patient felt that it felt similar to previous complex partial. The seizure felt milder than previous seizures, and patient’s teammates did not administer Valtoco. Patient states that she wanted to stay at home and sleep following the episode but was told by responding authorities to present to ED.     While on the Pediatric Neurology service she had a seizure and was subsequently loaded with Fosphenytoin. However patient never came back to baseline and CODE BAT was called. CT head and MRI head w/o contrast did not demonstrate acute intracranial findings. MRA head demonstrated an incidental saccular aneurysm measuring 2-3mm on the left supraclinoid/ophthalmic artery branch of the ICA. She was subsequently transferred to NCCU for LP and Angiogram. 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 MRI Brain w/ w/o done showed no evidence of Leptomeningeal enhancement. LP complete by IR on 2/10 without Xanthochromia.     On 2/11/21, she had 3 clinical seizures not captured on VEEG. Loaded with Phenytoin 400mg and continued with 200mg q8. VEEG placed back on without any evidence of seizure activity. Than on 2/12 patient agitated and was placed on Precedex drip. On 2/12/22 Phenytoin level critical and was discontinued. On 2/13 overnight patient had 4 seizure events that were not captured on vEEG. Originally was going to have Aneurysm clipped however due to seizures and thought to be incidental in nature in anyways procedure was delayed. Patient downgraded to General Neurology for VEEG monitoring. She has been down titrated off Briviact in hopes to catch an event to make a definitive diagnosis of Seizure disorder vs PNES (Psychogenic nonepileptic seizure). Thus far, there are no seizure like activity on vEEG.  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 she was placed back on Briviact 150mg BID  with recommendation to f/u with neurology OP. Patient was evaluated by PM&R and therapy for functional deficits, gait/ADL impairments and acute rehabilitation was recommended. Patient was medically optimized for discharge to [facility] IRU on 2/22/22. Patient was stable upon rehab admission to  Inpatient Rehabilitation Facility. Admitted with gait instabilty, ADL, and functional impairments.     Rehab Course significant for reports of intermittent dark stool with abdominal pain and left clavicular mobile mass. Gastroenterology was consulted and patient underwent EGD/colonoscopy on 3/1 for further evaluation. Post op course complicated by seizure episode. She was evaluated by ICU who recommended she be transferred to telemetry for close monitoring.

## 2022-03-01 NOTE — H&P ADULT - NSHPREVIEWOFSYSTEMS_GEN_ALL_CORE
REVIEW OF SYSTEMS:  CONSTITUTIONAL: No fever, weight loss, + fatigue  EYES: No eye pain, visual disturbances, or discharge  ENMT:  No difficulty hearing, tinnitus, vertigo; No sinus or throat pain  NECK: No neck pain or neck stiffness  BREASTS: No pain, masses, or nipple discharge  RESPIRATORY: No cough, wheezing, chills or hemoptysis; No shortness of breath  CARDIOVASCULAR: No chest pain, palpitations, dizziness, or leg swelling  GASTROINTESTINAL: No abdominal pain, No nausea, vomiting, or hematemesis; No diarrhea or constipation  GENITOURINARY: No dysuria, frequency, hematuria, or incontinence  NEUROLOGICAL: +seizure, No headaches, memory loss, loss of strength, numbness, or tremors  SKIN: No itching, burning, rashes, or lesions   LYMPH NODES: No enlarged glands  ENDOCRINE: No heat or cold intolerance; No hair loss  MUSCULOSKELETAL: No joint pain or swelling; No muscle, back, or extremity pain  PSYCHIATRIC: No depression, anxiety, mood swings, or difficulty sleeping  HEME/LYMPH: No easy bruising or bleeding  ALLERY AND IMMUNOLOGIC: No hives or eczema    All other ROS reviewed and negative except as otherwise stated

## 2022-03-01 NOTE — PROGRESS NOTE ADULT - PROVIDER SPECIALTY LIST ADULT
Neurology
Physiatry
Physiatry
Rehab Medicine
Rehab Medicine
Hospitalist
Physiatry
Physiatry
Hospitalist
Hospitalist

## 2022-03-01 NOTE — DISCHARGE NOTE PROVIDER - NSDCCPCAREPLAN_GEN_ALL_CORE_FT
PRINCIPAL DISCHARGE DIAGNOSIS  Diagnosis: Seizure  Assessment and Plan of Treatment: You were admitted to [facility] for acute rehabilitation after hospitalization at [facility] for seizures. After being discharged, please have close follow up with your PCP and neurologist.   Please continue to take your anti-seizure medication, Briviact, as instructed.      SECONDARY DISCHARGE DIAGNOSES  Diagnosis: Brain aneurysm  Assessment and Plan of Treatment: You were found to have an aneurysm at [facility]. Please follow up with your neurosurgeon for further management. You have an appointment scheduled for March 9th.    Diagnosis: Dark stools  Assessment and Plan of Treatment: You were evaluated by the gastroenterologist at [facility] for concerns about a GI bleed. You underwent an upper endoscopy and colonoscopy while admitted to [facility].

## 2022-03-01 NOTE — DISCHARGE NOTE PROVIDER - NSDCMRMEDTOKEN_GEN_ALL_CORE_FT
acetaminophen 325 mg oral tablet: 2 tab(s) orally every 6 hours, As needed, Temp greater or equal to 38.5C (101.3F), Mild Pain (1 - 3)  Briviact: 150 milligram(s) orally 2 times a day  freetext medication     -: 10 milligram(s) nasal once, As needed, seizures that last for more than 5 min   acetaminophen 325 mg oral tablet: 2 tab(s) orally every 6 hours, As needed, Temp greater or equal to 38.5C (101.3F), Mild Pain (1 - 3)  Briviact: 150 milligram(s) orally 2 times a day  freetext medication     -: 10 milligram(s) nasal once, As needed, seizures that last for more than 5 min  melatonin 3 mg oral tablet: 1 tab(s) orally once a day (at bedtime), As needed, Insomnia  polyethylene glycol 3350 oral powder for reconstitution: 17 gram(s) orally once a day  senna oral tablet: 2 tab(s) orally once a day (at bedtime), As needed, Constipation

## 2022-03-02 VITALS
RESPIRATION RATE: 15 BRPM | HEART RATE: 56 BPM | TEMPERATURE: 99 F | DIASTOLIC BLOOD PRESSURE: 62 MMHG | OXYGEN SATURATION: 99 % | SYSTOLIC BLOOD PRESSURE: 112 MMHG

## 2022-03-02 DIAGNOSIS — R10.30 LOWER ABDOMINAL PAIN, UNSPECIFIED: ICD-10-CM

## 2022-03-02 DIAGNOSIS — K62.5 HEMORRHAGE OF ANUS AND RECTUM: ICD-10-CM

## 2022-03-02 DIAGNOSIS — K92.1 MELENA: ICD-10-CM

## 2022-03-02 PROBLEM — Z87.898 PERSONAL HISTORY OF OTHER SPECIFIED CONDITIONS: Chronic | Status: ACTIVE | Noted: 2022-02-22

## 2022-03-02 PROCEDURE — 97161 PT EVAL LOW COMPLEX 20 MIN: CPT

## 2022-03-02 PROCEDURE — 97530 THERAPEUTIC ACTIVITIES: CPT

## 2022-03-02 PROCEDURE — 95812 EEG 41-60 MINUTES: CPT

## 2022-03-02 PROCEDURE — 84702 CHORIONIC GONADOTROPIN TEST: CPT

## 2022-03-02 PROCEDURE — 88305 TISSUE EXAM BY PATHOLOGIST: CPT | Mod: 26

## 2022-03-02 PROCEDURE — 99232 SBSQ HOSP IP/OBS MODERATE 35: CPT

## 2022-03-02 PROCEDURE — 80053 COMPREHEN METABOLIC PANEL: CPT

## 2022-03-02 PROCEDURE — U0003: CPT

## 2022-03-02 PROCEDURE — 36415 COLL VENOUS BLD VENIPUNCTURE: CPT

## 2022-03-02 PROCEDURE — 97163 PT EVAL HIGH COMPLEX 45 MIN: CPT

## 2022-03-02 PROCEDURE — 87635 SARS-COV-2 COVID-19 AMP PRB: CPT

## 2022-03-02 PROCEDURE — 88312 SPECIAL STAINS GROUP 1: CPT | Mod: 26

## 2022-03-02 PROCEDURE — 97535 SELF CARE MNGMENT TRAINING: CPT

## 2022-03-02 PROCEDURE — 88305 TISSUE EXAM BY PATHOLOGIST: CPT

## 2022-03-02 PROCEDURE — 85027 COMPLETE CBC AUTOMATED: CPT

## 2022-03-02 PROCEDURE — 83605 ASSAY OF LACTIC ACID: CPT

## 2022-03-02 PROCEDURE — 82962 GLUCOSE BLOOD TEST: CPT

## 2022-03-02 PROCEDURE — 92610 EVALUATE SWALLOWING FUNCTION: CPT

## 2022-03-02 PROCEDURE — 97110 THERAPEUTIC EXERCISES: CPT

## 2022-03-02 PROCEDURE — 99239 HOSP IP/OBS DSCHRG MGMT >30: CPT

## 2022-03-02 PROCEDURE — 82272 OCCULT BLD FECES 1-3 TESTS: CPT

## 2022-03-02 PROCEDURE — 92523 SPEECH SOUND LANG COMPREHEN: CPT

## 2022-03-02 PROCEDURE — 88312 SPECIAL STAINS GROUP 1: CPT

## 2022-03-02 PROCEDURE — 85025 COMPLETE CBC W/AUTO DIFF WBC: CPT

## 2022-03-02 PROCEDURE — U0005: CPT

## 2022-03-02 PROCEDURE — 92507 TX SP LANG VOICE COMM INDIV: CPT

## 2022-03-02 PROCEDURE — 97112 NEUROMUSCULAR REEDUCATION: CPT

## 2022-03-02 PROCEDURE — 97167 OT EVAL HIGH COMPLEX 60 MIN: CPT

## 2022-03-02 PROCEDURE — 97116 GAIT TRAINING THERAPY: CPT

## 2022-03-02 PROCEDURE — 80048 BASIC METABOLIC PNL TOTAL CA: CPT

## 2022-03-02 RX ORDER — POTASSIUM CHLORIDE 20 MEQ
40 PACKET (EA) ORAL EVERY 4 HOURS
Refills: 0 | Status: COMPLETED | OUTPATIENT
Start: 2022-03-02 | End: 2022-03-02

## 2022-03-02 RX ORDER — BRIVARACETAM 25 MG/1
2 TABLET, FILM COATED ORAL
Qty: 120 | Refills: 0
Start: 2022-03-02 | End: 2022-03-31

## 2022-03-02 RX ORDER — BRIVARACETAM 25 MG/1
150 TABLET, FILM COATED ORAL
Qty: 0 | Refills: 0 | DISCHARGE

## 2022-03-02 RX ADMIN — Medication 40 MILLIEQUIVALENT(S): at 09:13

## 2022-03-02 RX ADMIN — BRIVARACETAM 150 MILLIGRAM(S): 25 TABLET, FILM COATED ORAL at 09:12

## 2022-03-02 RX ADMIN — BRIVARACETAM 150 MILLIGRAM(S): 25 TABLET, FILM COATED ORAL at 17:10

## 2022-03-02 RX ADMIN — Medication 40 MILLIEQUIVALENT(S): at 14:47

## 2022-03-02 NOTE — DISCHARGE NOTE NURSING/CASE MANAGEMENT/SOCIAL WORK - NSCORESITESY/N_GEN_A_CORE_RD
3rd attempt to call patient, no answer left a detailed message. I am awaiting callback.    RECEPTION: Please route PT to available nurse if I am not available.  Patient need to confirm BP medications.     Thank you,  Gricel RADER    Yes

## 2022-03-02 NOTE — PROGRESS NOTE ADULT - PROBLEM SELECTOR PLAN 1
S/p EGD and colonoscopy without significant pathology.  Can follow up EGD bx as outpatient.  Routine age-appropriate colorectal cancer screening.  No GI contraindication to DC home.

## 2022-03-02 NOTE — PHYSICAL THERAPY INITIAL EVALUATION ADULT - ADDITIONAL COMMENTS
pt from acute rehab. independent w/transfers and ambulation. pt was living in dorm at [employer]. 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

## 2022-03-02 NOTE — DISCHARGE NOTE PROVIDER - CARE PROVIDER_API CALL
Judson Reeves; MPH)  Surgery  Phys Medicine  [facility]  [address] [place] [address]  Phone: [phone]  Fax: [phone]  Follow Up Time:

## 2022-03-02 NOTE — DISCHARGE NOTE NURSING/CASE MANAGEMENT/SOCIAL WORK - PATIENT PORTAL LINK FT
You can access the FollowMyHealth Patient Portal offered by [facility] by registering at the following website: [email or web address]. By joining [facility]’s FollowMyHealth portal, you will also be able to view your health information using other applications (apps) compatible with our system.

## 2022-03-02 NOTE — PROGRESS NOTE ADULT - ASSESSMENT
22 yo F PMH epilepsy presented to [place] ER on 2/5 for seizures after fall and head trauma, found to have saccular aneurysm measuring 2-3mm on the left supraclinoid/ophthalmic artery branch of the ICA s/p angio on 2/8 was admitted to  rehab for gait instability and ADL impairment.  Pt. went for EGD/colonscopy today for hx of dark stools and post-procedure had a breakthrough seizure.  She is admitted to tele for observation.    #Seizure disorder vs. Psychogenic non-epileptic seizure disorder  -cont Briviact 150 mg. bid  -Ativan prn breakthrough seizures  -Neurology note appreciated; cont current meds, EEG pending  -cont tele monitor  -seizure precautions    #Left ICA Carotid Ophthalmic Artery Aneurysm  #gait instability  - s/p angio on 2/8, aneurysm clipping deferred 2/2 multiple seizures   - PT/OT when stable    #Dark stools  - GI following; had EGD/Colonoscopy yesterday; no active bleeding seen    #DVT ppx  - Pt ambulating    Dispo:  If cleared by Neuro can be discharged home vs back to rehab. pt states she was scheduled for dc today from rehab.
Impression  21 year old F with a PMHx of focal epilepsy vs. PNES. The patient had a complicated hospital course with seizures that occurred when not on vEEG, and no seizures while on vEEG, raising the suspicion for psychogenic nonepileptic seizures. The patient has been seizure free since 2/13. She is s/p an event post colonoscopy that I suspect was a non epileptogenic seizure. She is now doing well without recurrent seizure and neuro exam and EEG both normal.    REC:  Either return to rehab or discharge.  Continue brivaracetam.   : 
21 year old woman with reported hx seizure d/o, recently dx saccular aneurysm, admitted to rehab, reporting black tarry stools with intermittent blood and abdominal pain, s/p EGD and colonoscopy without significant pathology noted. ?seizure post-procedure, now doing well. Cleared by neurology.

## 2022-03-02 NOTE — DISCHARGE NOTE PROVIDER - HOSPITAL COURSE
Hospital Course:  Per admitting physician:   HPI:  20 yo female with hx of Seizure, brain aneurysm s/p Colonoscopy and EGD today while in recovery room had a seizure.  Per nurse pt had a 3-5 minute period of "jolting movements and gazing to the left".  No tongue biting, no loss of urine.   Pt.'s vitals were normal.  Pt. was given 1 mg. of Ativan.  She was seen by Neurologist; advised to be admitted for observation post-ictal.  Per discussion with Dr Mckinney, GI the EGD/Colonoscopy were "normal".  Pt. was having procedures due to having "dark stools, r/o GIB".  Pt. c/o of feeling tired.  She does not want to go to medical floor and was very upset she was not going back to her Rehab bed.  She offered no other complaints, no HA, no visual disturbances, no numbness, no weakness. (01 Mar 2022 18:19)      Medical Floor Course:  ALEN SWAN was admitted to medical floor under the impression of seizure-like activity after colonoscopy. Patient was evaluated by Neurologist, EEG showed no epileptiform activity. Medically stable for discharge home.     Discharging Provider:  Derek Wallis MD  Contact Info: Cell [phone] - Please call with any questions or concerns.    I, the attending physician, was physically present for the key portions of the evaluation and management (E/M) service provided. The total amount of time spent reviewing the hospital course, laboratory values, imaging findings, assessing/counseling the patient, discussing with consultant physicians, social work, nursing staff was 31 minutes.

## 2022-03-02 NOTE — DISCHARGE NOTE PROVIDER - NSDCMRMEDTOKEN_GEN_ALL_CORE_FT
acetaminophen 325 mg oral tablet: 2 tab(s) orally every 6 hours, As needed, Temp greater or equal to 38.5C (101.3F), Mild Pain (1 - 3)  brivaracetam 75 mg oral tablet: 2 tab(s) orally 2 times a day MDD:4 tabs per day  polyethylene glycol 3350 oral powder for reconstitution: 17 gram(s) orally once a day

## 2022-03-02 NOTE — PROGRESS NOTE ADULT - SUBJECTIVE AND OBJECTIVE BOX
Neurology Progress Note  Subjective & Objective: Patient seen by me yesterday after endoscopy for suspected seizure.  Today she is doing fine no recurrence and EEG is normal.  Neuro exam intact.   EEG Report: · EEG Report	   REPORT OF ROUTINE EEG WITH VIDEO  [facility]: [address], [address] [place] [address], Phone: [phone] [facility]: [address], [place] [address], Phone: [phone] Office: [address] [place] [address], Phone: [phone]  Patient Name: ALEN SWAN   Age: 21 year : 2000 MRN #: -, Location: TELE BED [id number] Referring Physician: ARA HAWKINS  EEG #:  Study Date: 3/2/2022   Start Time: 10:06:29 AM    Study Duration: 20.8 		  Technical Information:					 On Instrument: - Placement and Labeling of Electrodes: The EEG was performed utilizing 20 channels referential EEG connections (coronal over temporal over parasagittal montage) using all standard 10-20 electrode placements with EKG.  Recording was at a sampling rate of 256 samples per second per channel.  Time synchronized digital video recording was done simultaneously with EEG recording.  A low light infrared camera was used for low light recording.  Jermain and seizure detection algorithms were utilized. CSA Technical Component: Quantitative EEG analysis using a separate Compressed Spectral Array (CSA) software package was conducted in real-time and run at bedside after set up by the technician, digitally displaying the power of electrographic frequencies included in the 1-30Hz band using a graded color map.  This data was reviewed and interpreted independently, and is reported in a separate section below.  History:  21  y o F pt p/w  -   Medication No Data.  Study Interpretation:  FINDINGS:  The background was continuous, spontaneously variable and reactive.  During wakefulness, the posteriorly dominant rhythm consisted of symmetric, well modulated 9 Hz activity, with an amplitude to 30 uV, that attenuated to eye opening.  Low amplitude central beta was noted in wakefulness.  Background Slowing: Generalized slowing: none was present. Focal slowing: none was present.  Sleep Background:            MEDICATIONS  (STANDING): brivaracetam 150 milliGRAM(s) Oral two times a day polyethylene glycol 3350 17 Gram(s) Oral daily potassium chloride    Tablet ER 40 milliEquivalent(s) Oral every 4 hours  MEDICATIONS  (PRN): acetaminophen     Tablet .. 650 milliGRAM(s) Oral every 6 hours PRN Temp greater or equal to 38C (100.4F), Mild Pain (1 - 3) LORazepam   Injectable 1 milliGRAM(s) IV Push every 6 hours PRN seizure      
Patient is a 21y old  Female who presents with a chief complaint of breakthrough seizure (01 Mar 2022 18:19)    Patient seen and examined at bedside. Tele overnight reviewed, no significant events.   States she wants to go home.     ALLERGIES:  No Known Allergies    MEDICATIONS  (STANDING):  brivaracetam 150 milliGRAM(s) Oral two times a day  polyethylene glycol 3350 17 Gram(s) Oral daily  potassium chloride    Tablet ER 40 milliEquivalent(s) Oral every 4 hours    MEDICATIONS  (PRN):  acetaminophen     Tablet .. 650 milliGRAM(s) Oral every 6 hours PRN Temp greater or equal to 38C (100.4F), Mild Pain (1 - 3)  LORazepam   Injectable 1 milliGRAM(s) IV Push every 6 hours PRN seizure    Vital Signs Last 24 Hrs  T(F): 98.5 (02 Mar 2022 08:07), Max: 98.5 (02 Mar 2022 08:07)  HR: 66 (02 Mar 2022 08:07) (50 - 66)  BP: 110/62 (02 Mar 2022 08:07) (100/66 - 139/79)  RR: 14 (02 Mar 2022 08:07) (14 - 18)  SpO2: 98% (02 Mar 2022 08:07) (98% - 100%)  I&O's Summary    PHYSICAL EXAM:  General: NAD, A/O x 3  ENT: MMM, no tonsilar exudate  Neck: Supple, No JVD  Lungs: Clear to auscultation bilaterally, no wheezes. Good air entry bilaterally   Cardio: RRR, S1/S2, No murmurs  Abdomen: Soft, Nontender, Nondistended; Bowel sounds present  Extremities: No calf tenderness, No pitting edema    LABS:                        13.1   10.14 )-----------( 262      ( 01 Mar 2022 19:20 )             41.2       03-01    142  |  106  |  8   ----------------------------<  82  3.4   |  26  |  0.81    Ca    9.0      01 Mar 2022 19:20    TPro  6.9  /  Alb  3.4  /  TBili  0.1  /  DBili  x   /  AST  19  /  ALT  29  /  AlkPhos  36  02-28     eGFR if Non African American: 113 mL/min/1.73M2 (02-28-22 @ 06:15)  eGFR if African American: 132 mL/min/1.73M2 (02-28-22 @ 06:15)     Lactate, Blood: 1.5 mmol/L (03-01 @ 19:20)    COVID-19 PCR: NotDetec (02-28-22 @ 20:15)  COVID-19 PCR: NotDetec (02-22-22 @ 22:50)    RADIOLOGY & ADDITIONAL TESTS:     Care Discussed with Consultants/Other Providers:   
Patient seen and examined at the bedside. Feels well, wants to go home.  EGD and colonoscopy performed yesterday - no apparent pathology noted     MEDICATIONS  (STANDING):  brivaracetam 150 milliGRAM(s) Oral two times a day  polyethylene glycol 3350 17 Gram(s) Oral daily    MEDICATIONS  (PRN):  acetaminophen     Tablet .. 650 milliGRAM(s) Oral every 6 hours PRN Temp greater or equal to 38C (100.4F), Mild Pain (1 - 3)  LORazepam   Injectable 1 milliGRAM(s) IV Push every 6 hours PRN seizure      Vital Signs Last 24 Hrs  T(C): 37 (02 Mar 2022 15:46), Max: 37 (02 Mar 2022 15:46)  T(F): 98.6 (02 Mar 2022 15:46), Max: 98.6 (02 Mar 2022 15:46)  HR: 56 (02 Mar 2022 15:46) (50 - 71)  BP: 112/62 (02 Mar 2022 15:46) (100/66 - 139/79)  BP(mean): --  RR: 15 (02 Mar 2022 15:46) (14 - 18)  SpO2: 99% (02 Mar 2022 15:46) (98% - 100%)    GEN: NAD  HEENT: NC/AT, anicteric, MMM  NECK: no mass or JVD  CHEST/LUNG: Clear to auscultation  HEART: RRR; S1/S2, No murmur  ABDOMEN: Soft, Nontender, Nondistended; +BS, no palpable mass  EXTREMITIES: No CCE  NEURO: Awake, alert, oriented x3 at present.

## 2022-03-02 NOTE — EEG REPORT - NS EEG TEXT BOX
REPORT OF ROUTINE EEG WITH VIDEO  [facility]: [address], [address] [place] [address], Phone: [phone] [facility]: [address], [place] [address], Phone: [phone] Office: [address] [place] [address], [facility] [address], [place] [address], Phone: [phone]  Patient Name: ALEN SWAN   Age: 21 year : 2000 MRN #: -, Location: TELE BED [id number] Referring Physician: ARA HAWKINS  EEG #:  Study Date: 3/2/2022   Start Time: 10:06:29 AM    Study Duration: 20.8 		  Technical Information:					 On Instrument: - Placement and Labeling of Electrodes: The EEG was performed utilizing 20 channels referential EEG connections (coronal over temporal over parasagittal montage) using all standard 10-20 electrode placements with EKG.  Recording was at a sampling rate of 256 samples per second per channel.  Time synchronized digital video recording was done simultaneously with EEG recording.  A low light infrared camera was used for low light recording.  Jermain and seizure detection algorithms were utilized. CSA Technical Component: Quantitative EEG analysis using a separate Compressed Spectral Array (CSA) software package was conducted in real-time and run at bedside after set up by the technician, digitally displaying the power of electrographic frequencies included in the 1-30Hz band using a graded color map.  This data was reviewed and interpreted independently, and is reported in a separate section below.  History:  21  y o F pt p/w  -   Medication No Data.  Study Interpretation:  FINDINGS:  The background was continuous, spontaneously variable and reactive.  During wakefulness, the posteriorly dominant rhythm consisted of symmetric, well modulated 9 Hz activity, with an amplitude to 30 uV, that attenuated to eye opening.  Low amplitude central beta was noted in wakefulness.  Background Slowing: Generalized slowing: none was present. Focal slowing: none was present.  Sleep Background: Stage II sleep transients were not recorded.  Non-epileptiform activity: None.  Epileptiform Activity:  No epileptiform discharges were present.  Events: No clinical events were recorded. No seizures were recorded.  Activation Procedures:  -Hyperventilation was not performed.   -Photic stimulation was performed and did not elicit any abnormalities.    Artifacts: Intermittent myogenic and movement artifacts were noted.  ECG: The heart rate on single channel ECG was predominantly between 80-90 BPM.  EEG Classification / Summary:  Normal EEG in the awake state  Clinical Impression:  Normal study. No epileptiform pattern or seizure seen.  Preliminary Fellow Report, final report pending attending review  Jacob Schwartz MD Epilepsy Fellow  Reading Room: [phone] On Call Service After Hours: [phone]    REPORT OF ROUTINE EEG WITH VIDEO  [facility]: [address], [address] [place] [address], Phone: [phone] [facility]: [address], [place] [address], Phone: [phone] Office: [address] [place] [address], [facility] [address], [place] [address], Phone: [phone]  Patient Name: ALEN SWAN   Age: 21 year : 2000 MRN #: -, Location: TELE BED [id number] Referring Physician: ARA HAWKINS  EEG #:  Study Date: 3/2/2022   Start Time: 10:06:29 AM    Study Duration: 20.8 		  Technical Information:					 On Instrument: - Placement and Labeling of Electrodes: The EEG was performed utilizing 20 channels referential EEG connections (coronal over temporal over parasagittal montage) using all standard 10-20 electrode placements with EKG.  Recording was at a sampling rate of 256 samples per second per channel.  Time synchronized digital video recording was done simultaneously with EEG recording.  A low light infrared camera was used for low light recording.  Jermain and seizure detection algorithms were utilized. CSA Technical Component: Quantitative EEG analysis using a separate Compressed Spectral Array (CSA) software package was conducted in real-time and run at bedside after set up by the technician, digitally displaying the power of electrographic frequencies included in the 1-30Hz band using a graded color map.  This data was reviewed and interpreted independently, and is reported in a separate section below.  History:  21  y o F pt p/w  -   Medication No Data.  Study Interpretation:  FINDINGS:  The background was continuous, spontaneously variable and reactive.  During wakefulness, the posteriorly dominant rhythm consisted of symmetric, well modulated 9 Hz activity, with an amplitude to 30 uV, that attenuated to eye opening.  Low amplitude central beta was noted in wakefulness.  Background Slowing: Generalized slowing: none was present. Focal slowing: none was present.  Sleep Background: Stage II sleep transients were not recorded.  Non-epileptiform activity: None.  Epileptiform Activity:  No epileptiform discharges were present.  Events: No clinical events were recorded. No seizures were recorded.  Activation Procedures:  -Hyperventilation was not performed.   -Photic stimulation was performed and did not elicit any abnormalities.    Artifacts: Intermittent myogenic and movement artifacts were noted.  ECG: The heart rate on single channel ECG was predominantly between 80-90 BPM.  EEG Classification / Summary:  Normal EEG in the awake state  Clinical Impression:  Normal study. No epileptiform pattern or seizure seen.  Jacob Schwartz MD Epilepsy Fellow  Drew Portillo MD EEG/Epilepsy Attending   Reading Room: [phone] On Call Service After Hours: [phone]

## 2022-03-04 LAB — SURGICAL PATHOLOGY STUDY: SIGNIFICANT CHANGE UP

## 2022-03-16 DIAGNOSIS — G40.909 EPILEPSY, UNSPECIFIED, NOT INTRACTABLE, WITHOUT STATUS EPILEPTICUS: ICD-10-CM

## 2022-04-08 LAB — GLUCOSE BLDC GLUCOMTR-MCNC: 84 MG/DL — SIGNIFICANT CHANGE UP (ref 70–99)

## 2023-07-12 NOTE — PATIENT PROFILE ADULT - FALL HARM RISK - FALLEN IN PAST
[FreeTextEntry1] : A1c 7.1 up from 6.8\par We discussed incr dose of metf to bid, declines. Declines to add another agent, either SGLT2 or GLP1.\par States would like to work on diet/exer and recheck 3 mos.\par Declines vaccines- shingrix, prev.\par check labs when fasting (Note: lab A1cs have been higher than in-office).\par All else uptodate.\par f/u 3 mos
No

## 2023-08-02 NOTE — PATIENT PROFILE ADULT - OVER THE PAST TWO WEEKS, HAVE YOU FELT LITTLE INTEREST OR PLEASURE IN DOING THINGS?
no Complex Repair And W Plasty Text: The defect edges were debeveled with a #15 scalpel blade.  The primary defect was closed partially with a complex linear closure.  Given the location of the remaining defect, shape of the defect and the proximity to free margins a W plasty was deemed most appropriate for complete closure of the defect.  Using a sterile surgical marker, an appropriate advancement flap was drawn incorporating the defect and placing the expected incisions within the relaxed skin tension lines where possible. The area thus outlined was incised deep to adipose tissue with a #15 scalpel blade. The skin margins were undermined to an appropriate distance in all directions utilizing iris scissors and carried over to close the primary defect.

## 2023-11-24 ENCOUNTER — INPATIENT (INPATIENT)
Facility: HOSPITAL | Age: 23
LOS: 0 days | Discharge: HOME | DRG: 101 | End: 2023-11-24
Attending: STUDENT IN AN ORGANIZED HEALTH CARE EDUCATION/TRAINING PROGRAM | Admitting: STUDENT IN AN ORGANIZED HEALTH CARE EDUCATION/TRAINING PROGRAM
Payer: COMMERCIAL

## 2023-11-24 VITALS
HEART RATE: 57 BPM | SYSTOLIC BLOOD PRESSURE: 124 MMHG | RESPIRATION RATE: 18 BRPM | DIASTOLIC BLOOD PRESSURE: 83 MMHG | OXYGEN SATURATION: 100 %

## 2023-11-24 VITALS
HEART RATE: 61 BPM | OXYGEN SATURATION: 100 % | DIASTOLIC BLOOD PRESSURE: 67 MMHG | SYSTOLIC BLOOD PRESSURE: 119 MMHG | RESPIRATION RATE: 18 BRPM | TEMPERATURE: 98 F

## 2023-11-24 DIAGNOSIS — G40.909 EPILEPSY, UNSPECIFIED, NOT INTRACTABLE, WITHOUT STATUS EPILEPTICUS: ICD-10-CM

## 2023-11-24 PROBLEM — I67.1 CEREBRAL ANEURYSM, NONRUPTURED: Chronic | Status: ACTIVE | Noted: 2022-03-01

## 2023-11-24 LAB
ALBUMIN SERPL ELPH-MCNC: 3.4 G/DL — SIGNIFICANT CHANGE UP (ref 3.3–5)
ALBUMIN SERPL ELPH-MCNC: 3.4 G/DL — SIGNIFICANT CHANGE UP (ref 3.3–5)
ALP SERPL-CCNC: 31 U/L — LOW (ref 40–120)
ALP SERPL-CCNC: 31 U/L — LOW (ref 40–120)
ALT FLD-CCNC: 25 U/L — SIGNIFICANT CHANGE UP (ref 12–78)
ALT FLD-CCNC: 25 U/L — SIGNIFICANT CHANGE UP (ref 12–78)
AMPHET UR-MCNC: NEGATIVE — SIGNIFICANT CHANGE UP
AMPHET UR-MCNC: NEGATIVE — SIGNIFICANT CHANGE UP
ANION GAP SERPL CALC-SCNC: 5 MMOL/L — SIGNIFICANT CHANGE UP (ref 5–17)
ANION GAP SERPL CALC-SCNC: 5 MMOL/L — SIGNIFICANT CHANGE UP (ref 5–17)
APAP SERPL-MCNC: 31 UG/ML — HIGH (ref 10–30)
APAP SERPL-MCNC: 31 UG/ML — HIGH (ref 10–30)
APPEARANCE UR: CLEAR — SIGNIFICANT CHANGE UP
APPEARANCE UR: CLEAR — SIGNIFICANT CHANGE UP
AST SERPL-CCNC: 14 U/L — LOW (ref 15–37)
AST SERPL-CCNC: 14 U/L — LOW (ref 15–37)
BACTERIA # UR AUTO: NEGATIVE /HPF — SIGNIFICANT CHANGE UP
BACTERIA # UR AUTO: NEGATIVE /HPF — SIGNIFICANT CHANGE UP
BARBITURATES UR SCN-MCNC: NEGATIVE — SIGNIFICANT CHANGE UP
BARBITURATES UR SCN-MCNC: NEGATIVE — SIGNIFICANT CHANGE UP
BASOPHILS # BLD AUTO: 0.07 K/UL — SIGNIFICANT CHANGE UP (ref 0–0.2)
BASOPHILS # BLD AUTO: 0.07 K/UL — SIGNIFICANT CHANGE UP (ref 0–0.2)
BASOPHILS NFR BLD AUTO: 0.4 % — SIGNIFICANT CHANGE UP (ref 0–2)
BASOPHILS NFR BLD AUTO: 0.4 % — SIGNIFICANT CHANGE UP (ref 0–2)
BENZODIAZ UR-MCNC: NEGATIVE — SIGNIFICANT CHANGE UP
BENZODIAZ UR-MCNC: NEGATIVE — SIGNIFICANT CHANGE UP
BILIRUB SERPL-MCNC: 0.2 MG/DL — SIGNIFICANT CHANGE UP (ref 0.2–1.2)
BILIRUB SERPL-MCNC: 0.2 MG/DL — SIGNIFICANT CHANGE UP (ref 0.2–1.2)
BILIRUB UR-MCNC: NEGATIVE — SIGNIFICANT CHANGE UP
BILIRUB UR-MCNC: NEGATIVE — SIGNIFICANT CHANGE UP
BUN SERPL-MCNC: 14 MG/DL — SIGNIFICANT CHANGE UP (ref 7–23)
BUN SERPL-MCNC: 14 MG/DL — SIGNIFICANT CHANGE UP (ref 7–23)
CALCIUM SERPL-MCNC: 8.6 MG/DL — SIGNIFICANT CHANGE UP (ref 8.5–10.1)
CALCIUM SERPL-MCNC: 8.6 MG/DL — SIGNIFICANT CHANGE UP (ref 8.5–10.1)
CAST: 0 /LPF — SIGNIFICANT CHANGE UP (ref 0–4)
CAST: 0 /LPF — SIGNIFICANT CHANGE UP (ref 0–4)
CHLORIDE SERPL-SCNC: 110 MMOL/L — HIGH (ref 96–108)
CHLORIDE SERPL-SCNC: 110 MMOL/L — HIGH (ref 96–108)
CK SERPL-CCNC: 59 U/L — SIGNIFICANT CHANGE UP (ref 26–192)
CK SERPL-CCNC: 59 U/L — SIGNIFICANT CHANGE UP (ref 26–192)
CO2 SERPL-SCNC: 24 MMOL/L — SIGNIFICANT CHANGE UP (ref 22–31)
CO2 SERPL-SCNC: 24 MMOL/L — SIGNIFICANT CHANGE UP (ref 22–31)
COCAINE METAB.OTHER UR-MCNC: NEGATIVE — SIGNIFICANT CHANGE UP
COCAINE METAB.OTHER UR-MCNC: NEGATIVE — SIGNIFICANT CHANGE UP
COLOR SPEC: ABNORMAL
COLOR SPEC: ABNORMAL
CREAT SERPL-MCNC: 0.96 MG/DL — SIGNIFICANT CHANGE UP (ref 0.5–1.3)
CREAT SERPL-MCNC: 0.96 MG/DL — SIGNIFICANT CHANGE UP (ref 0.5–1.3)
DIFF PNL FLD: ABNORMAL
DIFF PNL FLD: ABNORMAL
EGFR: 86 ML/MIN/1.73M2 — SIGNIFICANT CHANGE UP
EGFR: 86 ML/MIN/1.73M2 — SIGNIFICANT CHANGE UP
EOSINOPHIL # BLD AUTO: 0.02 K/UL — SIGNIFICANT CHANGE UP (ref 0–0.5)
EOSINOPHIL # BLD AUTO: 0.02 K/UL — SIGNIFICANT CHANGE UP (ref 0–0.5)
EOSINOPHIL NFR BLD AUTO: 0.1 % — SIGNIFICANT CHANGE UP (ref 0–6)
EOSINOPHIL NFR BLD AUTO: 0.1 % — SIGNIFICANT CHANGE UP (ref 0–6)
ETHANOL SERPL-MCNC: <10 MG/DL — SIGNIFICANT CHANGE UP (ref 0–10)
ETHANOL SERPL-MCNC: <10 MG/DL — SIGNIFICANT CHANGE UP (ref 0–10)
FLUAV AG NPH QL: SIGNIFICANT CHANGE UP
FLUAV AG NPH QL: SIGNIFICANT CHANGE UP
FLUBV AG NPH QL: SIGNIFICANT CHANGE UP
FLUBV AG NPH QL: SIGNIFICANT CHANGE UP
GLUCOSE SERPL-MCNC: 107 MG/DL — HIGH (ref 70–99)
GLUCOSE SERPL-MCNC: 107 MG/DL — HIGH (ref 70–99)
GLUCOSE UR QL: NEGATIVE MG/DL — SIGNIFICANT CHANGE UP
GLUCOSE UR QL: NEGATIVE MG/DL — SIGNIFICANT CHANGE UP
HCG SERPL-ACNC: <1 MIU/ML — SIGNIFICANT CHANGE UP
HCG SERPL-ACNC: <1 MIU/ML — SIGNIFICANT CHANGE UP
HCT VFR BLD CALC: 36.6 % — SIGNIFICANT CHANGE UP (ref 34.5–45)
HCT VFR BLD CALC: 36.6 % — SIGNIFICANT CHANGE UP (ref 34.5–45)
HGB BLD-MCNC: 11.6 G/DL — SIGNIFICANT CHANGE UP (ref 11.5–15.5)
HGB BLD-MCNC: 11.6 G/DL — SIGNIFICANT CHANGE UP (ref 11.5–15.5)
IMM GRANULOCYTES NFR BLD AUTO: 0.9 % — SIGNIFICANT CHANGE UP (ref 0–0.9)
IMM GRANULOCYTES NFR BLD AUTO: 0.9 % — SIGNIFICANT CHANGE UP (ref 0–0.9)
KETONES UR-MCNC: NEGATIVE MG/DL — SIGNIFICANT CHANGE UP
KETONES UR-MCNC: NEGATIVE MG/DL — SIGNIFICANT CHANGE UP
LACTATE SERPL-SCNC: 1.5 MMOL/L — SIGNIFICANT CHANGE UP (ref 0.7–2)
LACTATE SERPL-SCNC: 1.5 MMOL/L — SIGNIFICANT CHANGE UP (ref 0.7–2)
LEUKOCYTE ESTERASE UR-ACNC: ABNORMAL
LEUKOCYTE ESTERASE UR-ACNC: ABNORMAL
LYMPHOCYTES # BLD AUTO: 17.5 % — SIGNIFICANT CHANGE UP (ref 13–44)
LYMPHOCYTES # BLD AUTO: 17.5 % — SIGNIFICANT CHANGE UP (ref 13–44)
LYMPHOCYTES # BLD AUTO: 3.24 K/UL — SIGNIFICANT CHANGE UP (ref 1–3.3)
LYMPHOCYTES # BLD AUTO: 3.24 K/UL — SIGNIFICANT CHANGE UP (ref 1–3.3)
MAGNESIUM SERPL-MCNC: 2.3 MG/DL — SIGNIFICANT CHANGE UP (ref 1.6–2.6)
MAGNESIUM SERPL-MCNC: 2.3 MG/DL — SIGNIFICANT CHANGE UP (ref 1.6–2.6)
MCHC RBC-ENTMCNC: 27.4 PG — SIGNIFICANT CHANGE UP (ref 27–34)
MCHC RBC-ENTMCNC: 27.4 PG — SIGNIFICANT CHANGE UP (ref 27–34)
MCHC RBC-ENTMCNC: 31.7 GM/DL — LOW (ref 32–36)
MCHC RBC-ENTMCNC: 31.7 GM/DL — LOW (ref 32–36)
MCV RBC AUTO: 86.3 FL — SIGNIFICANT CHANGE UP (ref 80–100)
MCV RBC AUTO: 86.3 FL — SIGNIFICANT CHANGE UP (ref 80–100)
METHADONE UR-MCNC: NEGATIVE — SIGNIFICANT CHANGE UP
METHADONE UR-MCNC: NEGATIVE — SIGNIFICANT CHANGE UP
MONOCYTES # BLD AUTO: 1.24 K/UL — HIGH (ref 0–0.9)
MONOCYTES # BLD AUTO: 1.24 K/UL — HIGH (ref 0–0.9)
MONOCYTES NFR BLD AUTO: 6.7 % — SIGNIFICANT CHANGE UP (ref 2–14)
MONOCYTES NFR BLD AUTO: 6.7 % — SIGNIFICANT CHANGE UP (ref 2–14)
NEUTROPHILS # BLD AUTO: 13.78 K/UL — HIGH (ref 1.8–7.4)
NEUTROPHILS # BLD AUTO: 13.78 K/UL — HIGH (ref 1.8–7.4)
NEUTROPHILS NFR BLD AUTO: 74.4 % — SIGNIFICANT CHANGE UP (ref 43–77)
NEUTROPHILS NFR BLD AUTO: 74.4 % — SIGNIFICANT CHANGE UP (ref 43–77)
NITRITE UR-MCNC: NEGATIVE — SIGNIFICANT CHANGE UP
NITRITE UR-MCNC: NEGATIVE — SIGNIFICANT CHANGE UP
OPIATES UR-MCNC: NEGATIVE — SIGNIFICANT CHANGE UP
OPIATES UR-MCNC: NEGATIVE — SIGNIFICANT CHANGE UP
PCP SPEC-MCNC: SIGNIFICANT CHANGE UP
PCP SPEC-MCNC: SIGNIFICANT CHANGE UP
PCP UR-MCNC: NEGATIVE — SIGNIFICANT CHANGE UP
PCP UR-MCNC: NEGATIVE — SIGNIFICANT CHANGE UP
PH UR: 6.5 — SIGNIFICANT CHANGE UP (ref 5–8)
PH UR: 6.5 — SIGNIFICANT CHANGE UP (ref 5–8)
PLATELET # BLD AUTO: 322 K/UL — SIGNIFICANT CHANGE UP (ref 150–400)
PLATELET # BLD AUTO: 322 K/UL — SIGNIFICANT CHANGE UP (ref 150–400)
POTASSIUM SERPL-MCNC: 4.2 MMOL/L — SIGNIFICANT CHANGE UP (ref 3.5–5.3)
POTASSIUM SERPL-MCNC: 4.2 MMOL/L — SIGNIFICANT CHANGE UP (ref 3.5–5.3)
POTASSIUM SERPL-SCNC: 4.2 MMOL/L — SIGNIFICANT CHANGE UP (ref 3.5–5.3)
POTASSIUM SERPL-SCNC: 4.2 MMOL/L — SIGNIFICANT CHANGE UP (ref 3.5–5.3)
PROLACTIN SERPL-MCNC: 33.8 NG/ML — HIGH (ref 3.4–24.1)
PROLACTIN SERPL-MCNC: 33.8 NG/ML — HIGH (ref 3.4–24.1)
PROT SERPL-MCNC: 7.2 GM/DL — SIGNIFICANT CHANGE UP (ref 6–8.3)
PROT SERPL-MCNC: 7.2 GM/DL — SIGNIFICANT CHANGE UP (ref 6–8.3)
PROT UR-MCNC: NEGATIVE MG/DL — SIGNIFICANT CHANGE UP
PROT UR-MCNC: NEGATIVE MG/DL — SIGNIFICANT CHANGE UP
RBC # BLD: 4.24 M/UL — SIGNIFICANT CHANGE UP (ref 3.8–5.2)
RBC # BLD: 4.24 M/UL — SIGNIFICANT CHANGE UP (ref 3.8–5.2)
RBC # FLD: 14.6 % — HIGH (ref 10.3–14.5)
RBC # FLD: 14.6 % — HIGH (ref 10.3–14.5)
RBC CASTS # UR COMP ASSIST: 607 /HPF — HIGH (ref 0–4)
RBC CASTS # UR COMP ASSIST: 607 /HPF — HIGH (ref 0–4)
RSV RNA NPH QL NAA+NON-PROBE: SIGNIFICANT CHANGE UP
RSV RNA NPH QL NAA+NON-PROBE: SIGNIFICANT CHANGE UP
SALICYLATES SERPL-MCNC: <1.7 MG/DL — LOW (ref 2.8–20)
SALICYLATES SERPL-MCNC: <1.7 MG/DL — LOW (ref 2.8–20)
SARS-COV-2 RNA SPEC QL NAA+PROBE: SIGNIFICANT CHANGE UP
SARS-COV-2 RNA SPEC QL NAA+PROBE: SIGNIFICANT CHANGE UP
SODIUM SERPL-SCNC: 139 MMOL/L — SIGNIFICANT CHANGE UP (ref 135–145)
SODIUM SERPL-SCNC: 139 MMOL/L — SIGNIFICANT CHANGE UP (ref 135–145)
SP GR SPEC: 1.01 — SIGNIFICANT CHANGE UP (ref 1–1.03)
SP GR SPEC: 1.01 — SIGNIFICANT CHANGE UP (ref 1–1.03)
SQUAMOUS # UR AUTO: 2 /HPF — SIGNIFICANT CHANGE UP (ref 0–5)
SQUAMOUS # UR AUTO: 2 /HPF — SIGNIFICANT CHANGE UP (ref 0–5)
THC UR QL: NEGATIVE — SIGNIFICANT CHANGE UP
THC UR QL: NEGATIVE — SIGNIFICANT CHANGE UP
UROBILINOGEN FLD QL: 0.2 MG/DL — SIGNIFICANT CHANGE UP (ref 0.2–1)
UROBILINOGEN FLD QL: 0.2 MG/DL — SIGNIFICANT CHANGE UP (ref 0.2–1)
WBC # BLD: 18.51 K/UL — HIGH (ref 3.8–10.5)
WBC # BLD: 18.51 K/UL — HIGH (ref 3.8–10.5)
WBC # FLD AUTO: 18.51 K/UL — HIGH (ref 3.8–10.5)
WBC # FLD AUTO: 18.51 K/UL — HIGH (ref 3.8–10.5)
WBC UR QL: 8 /HPF — HIGH (ref 0–5)
WBC UR QL: 8 /HPF — HIGH (ref 0–5)

## 2023-11-24 PROCEDURE — 99285 EMERGENCY DEPT VISIT HI MDM: CPT

## 2023-11-24 PROCEDURE — 81001 URINALYSIS AUTO W/SCOPE: CPT

## 2023-11-24 PROCEDURE — 84146 ASSAY OF PROLACTIN: CPT

## 2023-11-24 PROCEDURE — 99223 1ST HOSP IP/OBS HIGH 75: CPT

## 2023-11-24 PROCEDURE — 83605 ASSAY OF LACTIC ACID: CPT

## 2023-11-24 PROCEDURE — 70450 CT HEAD/BRAIN W/O DYE: CPT | Mod: 26,MA

## 2023-11-24 PROCEDURE — 82550 ASSAY OF CK (CPK): CPT

## 2023-11-24 PROCEDURE — 36415 COLL VENOUS BLD VENIPUNCTURE: CPT

## 2023-11-24 PROCEDURE — 0241U: CPT

## 2023-11-24 PROCEDURE — 71045 X-RAY EXAM CHEST 1 VIEW: CPT | Mod: 26

## 2023-11-24 PROCEDURE — 99236 HOSP IP/OBS SAME DATE HI 85: CPT

## 2023-11-24 PROCEDURE — 80307 DRUG TEST PRSMV CHEM ANLYZR: CPT

## 2023-11-24 PROCEDURE — 93010 ELECTROCARDIOGRAM REPORT: CPT

## 2023-11-24 PROCEDURE — 95816 EEG AWAKE AND DROWSY: CPT

## 2023-11-24 PROCEDURE — 95816 EEG AWAKE AND DROWSY: CPT | Mod: 26

## 2023-11-24 PROCEDURE — 80175 DRUG SCREEN QUAN LAMOTRIGINE: CPT

## 2023-11-24 RX ORDER — SODIUM CHLORIDE 9 MG/ML
2000 INJECTION INTRAMUSCULAR; INTRAVENOUS; SUBCUTANEOUS ONCE
Refills: 0 | Status: COMPLETED | OUTPATIENT
Start: 2023-11-24 | End: 2023-11-24

## 2023-11-24 RX ORDER — DIAZEPAM 5 MG
20 TABLET ORAL
Refills: 0 | DISCHARGE

## 2023-11-24 RX ORDER — LAMOTRIGINE 25 MG/1
1 TABLET, ORALLY DISINTEGRATING ORAL
Refills: 0 | DISCHARGE

## 2023-11-24 RX ORDER — PANTOPRAZOLE SODIUM 20 MG/1
1 TABLET, DELAYED RELEASE ORAL
Refills: 0 | DISCHARGE

## 2023-11-24 RX ORDER — LAMOTRIGINE 25 MG/1
75 TABLET, ORALLY DISINTEGRATING ORAL
Refills: 0 | Status: DISCONTINUED | OUTPATIENT
Start: 2023-11-24 | End: 2023-11-24

## 2023-11-24 RX ORDER — ONDANSETRON 8 MG/1
4 TABLET, FILM COATED ORAL EVERY 6 HOURS
Refills: 0 | Status: DISCONTINUED | OUTPATIENT
Start: 2023-11-24 | End: 2023-11-24

## 2023-11-24 RX ORDER — DIAZEPAM 5 MG
20 TABLET ORAL
Qty: 1 | Refills: 0
Start: 2023-11-24

## 2023-11-24 RX ORDER — LAMOTRIGINE 25 MG/1
25 TABLET, ORALLY DISINTEGRATING ORAL
Qty: 30 | Refills: 0
Start: 2023-11-24

## 2023-11-24 RX ORDER — LAMOTRIGINE 25 MG/1
50 TABLET, ORALLY DISINTEGRATING ORAL
Refills: 0 | Status: DISCONTINUED | OUTPATIENT
Start: 2023-11-24 | End: 2023-11-24

## 2023-11-24 RX ORDER — SODIUM CHLORIDE 9 MG/ML
1000 INJECTION INTRAMUSCULAR; INTRAVENOUS; SUBCUTANEOUS
Refills: 0 | Status: DISCONTINUED | OUTPATIENT
Start: 2023-11-24 | End: 2023-11-24

## 2023-11-24 RX ORDER — ACETAMINOPHEN 500 MG
650 TABLET ORAL EVERY 6 HOURS
Refills: 0 | Status: DISCONTINUED | OUTPATIENT
Start: 2023-11-24 | End: 2023-11-24

## 2023-11-24 RX ORDER — LEVETIRACETAM 250 MG/1
1000 TABLET, FILM COATED ORAL ONCE
Refills: 0 | Status: COMPLETED | OUTPATIENT
Start: 2023-11-24 | End: 2023-11-24

## 2023-11-24 RX ORDER — BRIVARACETAM 25 MG/1
75 TABLET, FILM COATED ORAL
Refills: 0 | Status: DISCONTINUED | OUTPATIENT
Start: 2023-11-24 | End: 2023-11-24

## 2023-11-24 RX ORDER — LAMOTRIGINE 25 MG/1
75 TABLET, ORALLY DISINTEGRATING ORAL
Refills: 0 | Status: DISCONTINUED | OUTPATIENT
Start: 2023-11-25 | End: 2023-11-24

## 2023-11-24 RX ORDER — MESALAMINE 400 MG
4 TABLET, DELAYED RELEASE (ENTERIC COATED) ORAL
Refills: 0 | DISCHARGE

## 2023-11-24 RX ORDER — BRIVARACETAM 25 MG/1
1 TABLET, FILM COATED ORAL
Refills: 0 | DISCHARGE

## 2023-11-24 RX ORDER — BRIVARACETAM 25 MG/1
50 TABLET, FILM COATED ORAL
Refills: 0 | Status: DISCONTINUED | OUTPATIENT
Start: 2023-11-24 | End: 2023-11-24

## 2023-11-24 RX ADMIN — SODIUM CHLORIDE 2000 MILLILITER(S): 9 INJECTION INTRAMUSCULAR; INTRAVENOUS; SUBCUTANEOUS at 02:50

## 2023-11-24 RX ADMIN — Medication 2 MILLIGRAM(S): at 02:29

## 2023-11-24 RX ADMIN — LEVETIRACETAM 400 MILLIGRAM(S): 250 TABLET, FILM COATED ORAL at 04:55

## 2023-11-24 RX ADMIN — Medication 2 MILLIGRAM(S): at 04:57

## 2023-11-24 RX ADMIN — LAMOTRIGINE 75 MILLIGRAM(S): 25 TABLET, ORALLY DISINTEGRATING ORAL at 11:54

## 2023-11-24 RX ADMIN — SODIUM CHLORIDE 125 MILLILITER(S): 9 INJECTION INTRAMUSCULAR; INTRAVENOUS; SUBCUTANEOUS at 06:27

## 2023-11-24 NOTE — DISCHARGE NOTE NURSING/CASE MANAGEMENT/SOCIAL WORK - PATIENT PORTAL LINK FT
You can access the FollowMyHealth Patient Portal offered by [facility] by registering at the following website: [email or web address]. By joining [facility]’s FollowMyHealth portal, you will also be able to view your health information using other applications (apps) compatible with our system.

## 2023-11-24 NOTE — CONSULT NOTE ADULT - ASSESSMENT
22 year old woman with seizures, diagnosed with psychogenic non-epileptic seizures at [facility], sacular aneurysm, IBD who presents after several seizures.  Family of her friend reported a total of four seizures. The first was followed by a prolonged postictal period as per the family, the 2nd and 3rd events did not have any post-ictal period.  She has been altered since the fourth event.    It is unclear if she has a epilepsy as well as PNES.    -Will get routine EEG for now.  -Continue Briviact 50 mg BID for now  -Continue Lamotrigine. Lamotrigine ER is not available in the hospital. Will dose 75 mg BID. May increase the dose of Lamotrigine if there are no seizures.  -Will speak with patient for more history when she is more alert.  -No driving.    Will f/u.

## 2023-11-24 NOTE — DISCHARGE NOTE PROVIDER - ATTENDING DISCHARGE PHYSICAL EXAMINATION:
VITALS:  T(F): 98.3 (11-24-23 @ 13:50), Max: 98.3 (11-24-23 @ 13:50)  HR: 61 (11-24-23 @ 13:50) (51 - 69)  BP: 119/67 (11-24-23 @ 13:50) (117/80 - 133/80)  RR: 18 (11-24-23 @ 13:50) (18 - 20)  SpO2: 100% (11-24-23 @ 13:50) (99% - 100%)  Wt(kg): --    I&O's Summary      CAPILLARY BLOOD GLUCOSE      POCT Blood Glucose.: 111 mg/dL (24 Nov 2023 02:23)      PHYSICAL EXAM:  Gen: No acute distress   HEENT:  pupils equal and reactive, EOMI,   NECK:   supple, no carotid bruits, No JVD  CV:  +S1, +S2, regular rate rhythm, no murmurs or rubs  RESP:   lungs clear to auscultation bilaterally, no wheezing, rales, rhonchi, good air entry bilaterally  GI:  abdomen soft, non-tender, non-distended, normal BS, no bruits, no abdominal masses, no palpable masses  MSK:   normal muscle tone, no atrophy, no rigidity, no contractions  EXT:  no clubbing, no cyanosis, no edema, no calf pain, swelling or erythema  VASCULAR:  pulses equal and symmetric in the upper and lower extremities  NEURO:  AAOX3, no focal neurological deficits, follows all commands, able to move extremities spontaneously  SKIN:  no ulcers, lesions or rashes

## 2023-11-24 NOTE — ED PROVIDER NOTE - OBJECTIVE STATEMENT
Pt. is a 21 yo F with hx of epilepsy on intranasal valium, Briviact, and lamictal presenting with seizure X 4 at a friends house.  Per patients friend who she is staying with for Thanksgiving, she has epilepsy and ran out of her valium nasal spray that she uses to abort seizures.  She has her other meds.  She is also being treated for unknown cancer per friend and friends mom.  Patient is a  at [employer], staying with college friend for Thanksgiving holiday.  Per friends, she slept all day yesterday, didn't really eat, hasn't been feeling well. Patient had 3-4 witnessed seizures prior to arrival.  Friend states she had spasticity of her arms, bit her lip, and appeared to stop breathing twice so they took her to the hospital.  Mom of patients friend in ED as well. Texting patients mom who states she ran out of her meds and needs her meds.  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  No headache, vomiting, fevers.  Patient complained of right shoulder pain per prior to arrival per friend.  Patient post ictal on arrival, poor historian, brought in quickly onto stretcher from car after seizure a few minutes prior to arrival. Pt. is a 21 yo F with hx of epilepsy on intranasal valium, Briviact, and lamictal presenting with seizure X 4 at a friends house.  Per patients friend who she is staying with for Thanksgiving, she has epilepsy and ran out of her valium nasal spray that she uses to abort seizures.  She has her other meds.  She is also being treated for unknown cancer per friend and friends mom.  Patient is a  at [employer], staying with college friend for Thanksgiving holiday.  Per friends, she slept all day yesterday, didn't really eat, hasn't been feeling well. Patient had 3-4 witnessed seizures prior to arrival.  Friend states she had spasticity of her arms, bit her lip, and appeared to stop breathing twice so they took her to the hospital.  Mom of patients friend in ED as well. Texting patients mom who states she ran out of her meds and needs her meds.  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  No headache, vomiting, fevers.  Patient complained of right shoulder pain per prior to arrival per friend.  Patient post ictal on arrival, poor historian, brought in quickly onto stretcher from car after seizure a few minutes prior to arrival.  Also in patients bag with her meds is a bottle of prednisone for unknown reason.

## 2023-11-24 NOTE — ED ADULT NURSE NOTE - NSFALLRISKINTERV_ED_ALL_ED

## 2023-11-24 NOTE — ED ADULT TRIAGE NOTE - CHIEF COMPLAINT QUOTE
pt BIB friend c/o seizure like activity. per friend, patient has known epilepsy, has had 4 witnessed seizures today with apnea. code 99 called, pt is post-ictal, assisted to stretcher, taken to trauma room for evaluation

## 2023-11-24 NOTE — CONSULT NOTE ADULT - SUBJECTIVE AND OBJECTIVE BOX
Patient is a 22y old  Female who presents with a chief complaint of     HPI:  Patient is a 22y old  Female who presents with a chief complaint of seizures. She has a history of seizures.  She is a student at [employer] and was at a friend's family's house for [employer].   According to her friend's mother she had a seizure last week and used intransal diazepam to prevent  cluster of seizures. She did not have any more intranasal diazepam.  On 23 at about 11:30 Pm she had a seizure lasting 3.5-4 minutes. She had about a one hour post-ictal period. The seizure was described as a convulsion. She had a 2nd seizure at 1 AM lasting 1.5 minutes and a third at 1:52 lasting less than one minute. She reportedly had a quick return to baseline after these seizures but then had a fourth seizure at 2:09 lasting about five minutes from which she did not return to baseline.  She was given a total of 4 mg of lorazepam in the ED as well as Keppra.     I spoke to her mother in [place] ([phone]) who was not sure of her medication dosages.  She follows with Dr. Zhang at [employer]. Her office closed but I was able to speak to a neurology resident who reviewed her chart with me.  She was seen in the [employer] ED on 23 after a seizure. 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   A routine EEG was performed and was normal.  In 2023 during an office visit she was on Lamotrigine  mg/day and Briviact 75 mg BID. It seems that Lamotrigine was being titrated up and Briviact was being titrated down. The next office note from 10/26/23 states that she was on Lamotrigine  mg/day and Briviact 50 mg BID (with plans to wean this off).  The notes also states that she has had 15 days total of video EEG at [employer] with pseudoseizures captured but no epileptic events.     Her mother did report that she is receiving Remicaide infusions for inflammatory bowel disease.    The patient is currently restless and not following commands.         PAST MEDICAL & SURGICAL HISTORY:  History of seizure      Saccular aneurysm      No significant past surgical history    IBD    FAMILY HISTORY:  No pertinent family history in first degree relatives      Social History:      Allergies    No Known Allergies    Intolerances        MEDICATIONS  (STANDING):  brivaracetam 75 milliGRAM(s) Oral two times a day  sodium chloride 0.9%. 1000 milliLiter(s) (125 mL/Hr) IV Continuous <Continuous>    MEDICATIONS  (PRN):  acetaminophen     Tablet .. 650 milliGRAM(s) Oral every 6 hours PRN Temp greater or equal to 38C (100.4F), Mild Pain (1 - 3)  LORazepam   Injectable 2 milliGRAM(s) IV Push once PRN Seizure Activity  ondansetron Injectable 4 milliGRAM(s) IV Push every 6 hours PRN Nausea and/or Vomiting      ROS:  Complete ROS obtained, negative other than what is stated in HPI     PEx  T(C): 36.5 (23 @ 04:27), Max: 36.5 (23 @ 04:27)  HR: 69 (23 @ 07:28) (51 - 69)  BP: 133/80 (23 @ 07:28) (117/80 - 133/80)  RR: 20 (23 @ 07:28) (18 - 20)  SpO2: 99% (23 @ 07:28) (99% - 100%)  Wt(kg): --  General:     Well appearing, well nourished in no distress,   Skin: no rash or prominent lesions  Head: normocephalic, atraumatic     Nose: no external lesions, mucosa non-inflamed, septum and turbinates normal  Neck: Supple Thyroid no thyromegaly,  Heart: RRR, no murmur or gallop.  Normal S1, S2.  No S3, S4.   Lungs: CTA bilaterally, no wheezes, rhonchi, rales.  Breathing unlabored.   Chest wall: Normal insp   Abdomen:  Soft, Non distended nontender positive bowel sounds No peritoneal signs.   Back: spine normal without deformity or tenderness.  Normal ROM   Extremities: No deformities, clubbing, cyanosis, or edema.  Musculoskeletal: Normal gait. No decreased range of motion, instability, atrophy or abnormal strength or tone in the head, neck, spine, ribs, pelvis or extremities.   Neurologic: CN 2-12 normal. Sensation to pain, touch and proprioception normal. DTRs normal in upper and lower extremities. No pathologic reflexes.  Motor normal.  Psychiatric: Oriented X3, intact judgement and insight, normal mood and affect.                          11.6   18.51 )-----------( 322      ( 2023 02:24 )             36.6         139  |  110<H>  |  14  ----------------------------<  107<H>  4.2   |  24  |  0.96    Ca    8.6      2023 02:24  Mg     2.3         TPro  7.2  /  Alb  3.4  /  TBili  0.2  /  DBili  x   /  AST  14<L>  /  ALT  25  /  AlkPhos  31<L>  24    CAPILLARY BLOOD GLUCOSE      POCT Blood Glucose.: 111 mg/dL (2023 02:23)      Urinalysis Basic - ( 2023 05:19 )    Color: Orange / Appearance: Clear / S.011 / pH: x  Gluc: x / Ketone: Negative mg/dL  / Bili: Negative / Urobili: 0.2 mg/dL   Blood: x / Protein: Negative mg/dL / Nitrite: Negative   Leuk Esterase: Trace / RBC: 607 /HPF / WBC 8 /HPF   Sq Epi: x / Non Sq Epi: 2 /HPF / Bacteria: Negative /HPF             (2023 09:03)      PAST MEDICAL & SURGICAL HISTORY:  History of seizure      Saccular aneurysm      No significant past surgical history          FAMILY HISTORY:  No pertinent family history in first degree relatives        Social Hx:  Nonsmoker, no drug or alcohol use    MEDICATIONS  (STANDING):  brivaracetam 75 milliGRAM(s) Oral two times a day  sodium chloride 0.9%. 1000 milliLiter(s) (125 mL/Hr) IV Continuous <Continuous>       Allergies    No Known Allergies    Intolerances        ROS: Pertinent positives in HPI, all other ROS were reviewed and are negative.      Vital Signs Last 24 Hrs  T(C): 36.5 (2023 04:27), Max: 36.5 (2023 04:27)  T(F): 97.7 (2023 04:27), Max: 97.7 (2023 04:27)  HR: 69 (2023 07:28) (51 - 69)  BP: 133/80 (2023 07:28) (117/80 - 133/80)  BP(mean): 95 (2023 07:) (91 - 96)  RR: 20 (2023 07:) (18 - 20)  SpO2: 99% (2023 07:28) (99% - 100%)    Parameters below as of 2023 07:28  Patient On (Oxygen Delivery Method): room air            Constitutional: awake and alert.  HEENT: PERRLA, EOMI,   Neck: Supple.  Respiratory: Breath sounds are clear bilaterally  Cardiovascular: S1 and S2, regular / irregular rhythm  Gastrointestinal: soft, nontender  Extremities:  no edema  Musculoskeletal: no joint swelling/tenderness, no abnormal movements  Skin: No rashes    Neurological exam:  HF: somnolent, restless, not following commands. Moaning, not verbalizing  CN: MARIE, no facial asymmetry noted  Motor: moving all extremities well  Sens: Intact to light touch  Reflexes: Symmetric and normal . BJ 2+, BR 2+, KJ 2+, AJ 2+, downgoing toes b/l  Coord:  unable to test  Gait/Balance unable to test            Labs:       139  |  110<H>  |  14  ----------------------------<  107<H>  4.2   |  24  |  0.96    Ca    8.6      2023 02:24  Mg     2.3         TPro  7.2  /  Alb  3.4  /  TBili  0.2  /  DBili  x   /  AST  14<L>  /  ALT  25  /  AlkPhos  31<L>                                11.6   18.51 )-----------( 322      ( 2023 02:24 )             36.6       Radiology:  CT head 23:  No evidence of acute intracranial hemorrhage, midline shift or CT   evidence of acute territorial infarct.

## 2023-11-24 NOTE — DISCHARGE NOTE PROVIDER - NSDCMRMEDTOKEN_GEN_ALL_CORE_FT
Briviact 50 mg oral tablet: 1 tab(s) orally 2 times a day  lamoTRIgine (blue dose pack) 25 mg oral tablet: 25 milligram(s) orally once a day patient has 100mg tabs at home,  Patient to take total 125mg daily x one week and then continue 150mg daily afterwards.  lamoTRIgine 100 mg oral tablet, extended release: 1 tab(s) orally once a day ***pt has both 50mg and 100mg filled within date, unknown if pt is taking both doses (150mg) or only 1 dose of either the  doses (100mg or 50mg)***  lamoTRIgine 50 mg oral tablet, extended release: 1 tab(s) orally once a day ***pt has both 50mg and 100mg filled within date, unknown if pt is taking both doses (150mg) or only 1 dose of either the  doses (100mg or 50mg)***  Lialda 1.2 g oral delayed release tablet: 4 tab(s) orally once a day  pantoprazole 40 mg oral delayed release tablet: 1 tab(s) orally once a day  predniSONE 10 mg oral tablet: 4 tab(s) orally once a day for 10 days  Valtoco 20 mg Dose nasal spray: 20 milligram(s) intranasally prn MDD: 20mg

## 2023-11-24 NOTE — H&P ADULT - ASSESSMENT
23 yo F with hx of epilepsy presented to the ED with seizure while at a friends house. Patient to be admitted to the hospitalist service for epilepsy with breakthrough seizures.        Epilepsy, with breakthrough seizures   Neurology Consulted recommendations appreciated   Continue home intranasal valium, Briviact, and lamictal  Will defer AED med changes to neuro  obtain lamotrigine levels   Obtain prolactin levels   Obtain utox and beta hcg   IV ativan for breakthrough seizures   Obtain records from [facility] regarding chemo infusions and steroid therapy         Code Status Full Code   DVT ppx SCDs

## 2023-11-24 NOTE — ED ADULT NURSE NOTE - OBJECTIVE STATEMENT
pt presented to er s/p seizure x4. per friend, patient recently ran out of her epilepsy meds. pmhx unknown cancer. pt postictal, brought to trauma room, ativan administered as per orders.

## 2023-11-24 NOTE — DISCHARGE NOTE PROVIDER - HOSPITAL COURSE
22y old  Female who presents with a chief complaint of epilectic seizure   HPI:  21 yo F with hx of epilepsy presented to the ED with seizure while at a friends house. patient is from [place] but currently a student at [employer] and was over her friends house for thanksgiving Friends mom in the room who gave history as patient was sleeping but arousable and still post ictal and after ativan administration.  Patient had 4 episodes of seizures starting last night from approx 11pm.  Patient was feeling tired earlier in the day and missed her 830pm medication by about an hour. She started to feel braxton something was off and took her medication around 930. She had her first seizure at 1130pm which lasted approx 3-4 mins. She was post ictal for about an hour but improved. Patient had her 2nd seizure at 130am which lasted 1minute 30 secs, did not have post ictal state. 3rd seizure at 1:50am and 4th at 2:09 am en route to the hospital. 1st and 4th seizure were described as convulsive seizures and the 2nd and 3rd seizures were lip spasticity. patient had another witnessed seizure in the ED and  IV ativan and IV keppra used to break the seizure. Patient on  intranasal valium, Briviact, and lamictal. Patient following with [facility] for recent colonoscopy and possible chemo infusions as per friends parent. Patient to be admitted to the hospitalist service for epilepsy with breakthrough seizures.  Her last seizure before this episode was one week ago, and she used her valium nasal spray and was unable to receive a refill due to shortage reasons. Patient was seen by neurology She follows with Dr. Zhang at [facility].  She was seen in the [facility] ED on 11/17/23 after a seizure. 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   A routine EEG was performed and was normal. In March 2023 during an office visit she was on Lamotrigine  mg/day and Briviact 75 mg BID. It seems that Lamotrigine was being titrated up and Briviact was being titrated down. The next office note from 10/26/23 states that she was on Lamotrigine  mg/day and Briviact 50 mg BID (with plans to wean this off). The notes also states that she has had 15 days total of video EEG at [facility] with pseudoseizures captured but no epileptic events.   Her mother did report that she is receiving Remicaide infusions for inflammatory bowel disease. In the afternoon of admission, patient was more awake and alert and wanted to go home. Discussed with Neurology and will slowly increase her dose of lamotrigine back to 150mg. will prescribe 25mg tab to take 125mg/day for one week and then 150mg/day thereafter. Patient to follow up with her neurologist in one week. Patient advised to return to ED with any new seizures fevers chills nausea vomiting chest pain shortness of breath diarrhea. Patient to take 50mg when she gets home tonight. Can cut her 100mg tab in half if unable to get to [[facility]

## 2023-11-24 NOTE — DISCHARGE NOTE NURSING/CASE MANAGEMENT/SOCIAL WORK - NSDCPEFALRISK_GEN_ALL_CORE
For information on Fall & Injury Prevention, visit: [email or web address] OR  [email or web address] OR  https://www.cdc.gov/steadi/patient.html no

## 2023-11-24 NOTE — H&P ADULT - HISTORY OF PRESENT ILLNESS
Patient is a 22y old  Female who presents with a chief complaint of   HPI:      Radiology/Imaging, I have personally reviewed:    PAST MEDICAL & SURGICAL HISTORY:  History of seizure      Saccular aneurysm      No significant past surgical history        FAMILY HISTORY:  No pertinent family history in first degree relatives      Social History:      Allergies    No Known Allergies    Intolerances        MEDICATIONS  (STANDING):  brivaracetam 75 milliGRAM(s) Oral two times a day  sodium chloride 0.9%. 1000 milliLiter(s) (125 mL/Hr) IV Continuous <Continuous>    MEDICATIONS  (PRN):  acetaminophen     Tablet .. 650 milliGRAM(s) Oral every 6 hours PRN Temp greater or equal to 38C (100.4F), Mild Pain (1 - 3)  LORazepam   Injectable 2 milliGRAM(s) IV Push once PRN Seizure Activity  ondansetron Injectable 4 milliGRAM(s) IV Push every 6 hours PRN Nausea and/or Vomiting      ROS:  Complete ROS obtained, negative other than what is stated in HPI     PEx  T(C): 36.5 (23 @ 04:27), Max: 36.5 (23 @ 04:27)  HR: 69 (23 @ 07:28) (51 - 69)  BP: 133/80 (23 @ 07:28) (117/80 - 133/80)  RR: 20 (23 @ 07:28) (18 - 20)  SpO2: 99% (23 @ 07:28) (99% - 100%)  Wt(kg): --  General:     Well appearing, well nourished in no distress,   Skin: no rash or prominent lesions  Head: normocephalic, atraumatic     Nose: no external lesions, mucosa non-inflamed, septum and turbinates normal  Neck: Supple Thyroid no thyromegaly,  Heart: RRR, no murmur or gallop.  Normal S1, S2.  No S3, S4.   Lungs: CTA bilaterally, no wheezes, rhonchi, rales.  Breathing unlabored.   Chest wall: Normal insp   Abdomen:  Soft, Non distended nontender positive bowel sounds No peritoneal signs.   Back: spine normal without deformity or tenderness.  Normal ROM   Extremities: No deformities, clubbing, cyanosis, or edema.  Musculoskeletal: Normal gait. No decreased range of motion, instability, atrophy or abnormal strength or tone in the head, neck, spine, ribs, pelvis or extremities.   Neurologic: CN 2-12 normal. Sensation to pain, touch and proprioception normal. DTRs normal in upper and lower extremities. No pathologic reflexes.  Motor normal.  Psychiatric: Oriented X3, intact judgement and insight, normal mood and affect.                          11.6   18.51 )-----------( 322      ( 2023 02:24 )             36.6         139  |  110<H>  |  14  ----------------------------<  107<H>  4.2   |  24  |  0.96    Ca    8.6      2023 02:24  Mg     2.3         TPro  7.2  /  Alb  3.4  /  TBili  0.2  /  DBili  x   /  AST  14<L>  /  ALT  25  /  AlkPhos  31<L>  24    CAPILLARY BLOOD GLUCOSE      POCT Blood Glucose.: 111 mg/dL (2023 02:23)      Urinalysis Basic - ( 2023 05:19 )    Color: Orange / Appearance: Clear / S.011 / pH: x  Gluc: x / Ketone: Negative mg/dL  / Bili: Negative / Urobili: 0.2 mg/dL   Blood: x / Protein: Negative mg/dL / Nitrite: Negative   Leuk Esterase: Trace / RBC: 607 /HPF / WBC 8 /HPF   Sq Epi: x / Non Sq Epi: 2 /HPF / Bacteria: Negative /HPF             Patient is a 22y old  Female who presents with a chief complaint of epilectic seizure   HPI:  21 yo F with hx of epilepsy presented to the ED with seizure while at a friends house. patient is from [place] but currently a student at [employer] and was over her friends house for thanksgiving Friends mom in the room who gave history as patient was sleeping but arousable and still post ictal and after ativan administration.  Patient had 4 episodes of seizures starting last night from approx 11pm.  Patient was feeling tired earlier in the day and missed her 830pm medication by about an hour. She started to feel braxton something was off and took her medication around 930. She had her first seizure at 1130pm which lasted approx 3-4 mins. She was post ictal for about an hour but improved. Patient had her 2nd seizure at 130am which lasted 1minute 30 secs, did not have post ictal state. 3rd seizure at 1:50am and 4th at 2:09 am en route to the hospital. 1st and 4th seizure were described as convulsive seizures and the 2nd and 3rd seizures were lip spasticity. patient had another witnessed seizure in the ED and  IV ativan and IV keppra used to break the seizure. Patient on  intranasal valium, Briviact, and lamictal. Patient following with [facility] for recent colonoscopy and possible chemo infusions as per friends parent. Patient to be admitted to the hospitalist service for epilepsy with breakthrough seizures.  Her last seizure before this episode was one week ago, and she used her valium nasal spray and was unable to receive a refill due to shortage reasons.         Radiology/Imaging, I have personally reviewed:    < from: CT Head No Cont (23 @ 04:02) >      IMPRESSION:    No evidence of acute intracranial hemorrhage, midline shift or CT   evidence of acute territorial infarct.    If the patient's symptoms persist, consider short interval follow-up head   CT or brain MRI if there are no MRI contraindications.    < end of copied text >      PAST MEDICAL & SURGICAL HISTORY:  History of seizure      Saccular aneurysm      past surgical history  Colonoscopy , with polyps         FAMILY HISTORY:  No pertinent family history in first degree relatives      Social History:  unable to obtain due ot patients mental status     Allergies    No Known Allergies    Intolerances        MEDICATIONS  (STANDING):  brivaracetam 75 milliGRAM(s) Oral two times a day  sodium chloride 0.9%. 1000 milliLiter(s) (125 mL/Hr) IV Continuous <Continuous>    MEDICATIONS  (PRN):  acetaminophen     Tablet .. 650 milliGRAM(s) Oral every 6 hours PRN Temp greater or equal to 38C (100.4F), Mild Pain (1 - 3)  LORazepam   Injectable 2 milliGRAM(s) IV Push once PRN Seizure Activity  ondansetron Injectable 4 milliGRAM(s) IV Push every 6 hours PRN Nausea and/or Vomiting      ROS:  unable to obtain due to patients mental status     PEx  T(C): 36.5 (23 @ 04:27), Max: 36.5 (23 @ 04:27)  HR: 69 (23 @ 07:28) (51 - 69)  BP: 133/80 (23 @ 07:28) (117/80 - 133/80)  RR: 20 (23 @ 07:28) (18 - 20)  SpO2: 99% (23 @ 07:28) (99% - 100%)  Wt(kg): --  · CONSTITUTIONAL: Drowsy, raises eyebrows to loud voices; responds to verbal stimuli  · ENMT: Airway patent, Nasal mucosa clear. Mouth with dry mucosa. Throat has no vesicles, no oropharyngeal exudates and uvula is midline.  · EYES: Clear bilaterally, pupils equal, round and reactive to light.  · CARDIAC: Normal rate, regular rhythm.  Heart sounds S1, S2.  No murmurs, rubs or gallops.  · RESPIRATORY: Breath sounds clear and equal bilaterally.  · GASTROINTESTINAL: Abdomen soft, non-tender, no guarding.  · MUSCULOSKELETAL: Spine appears normal, range of motion is not limited, no muscle or joint tenderness  · NEUROLOGICAL: +post ictal; drowsy, not following commands; no focal deficits, no motor or sensory deficits.  · SKIN: Skin normal color for race, warm, dry and intact. No evidence of rash. good cap refill.  · HEME LYMPH: No adenopathy or splenomegaly. No cervical or inguinal lymphadenopathy.                          11.6   18.51 )-----------( 322      ( 2023 02:24 )             36.6         139  |  110<H>  |  14  ----------------------------<  107<H>  4.2   |  24  |  0.96    Ca    8.6      2023 02:24  Mg     2.3     11-24    TPro  7.2  /  Alb  3.4  /  TBili  0.2  /  DBili  x   /  AST  14<L>  /  ALT  25  /  AlkPhos  31<L>  11-24    CAPILLARY BLOOD GLUCOSE      POCT Blood Glucose.: 111 mg/dL (2023 02:23)      Urinalysis Basic - ( 2023 05:19 )    Color: Orange / Appearance: Clear / S.011 / pH: x  Gluc: x / Ketone: Negative mg/dL  / Bili: Negative / Urobili: 0.2 mg/dL   Blood: x / Protein: Negative mg/dL / Nitrite: Negative   Leuk Esterase: Trace / RBC: 607 /HPF / WBC 8 /HPF   Sq Epi: x / Non Sq Epi: 2 /HPF / Bacteria: Negative /HPF

## 2023-11-24 NOTE — ED PROVIDER NOTE - PROGRESS NOTE DETAILS
CXR: NAD Per patients friend, she is usually on [place], here all summer, here most of the year, does not really return to home. Very secretive about her medical care.  Parent at bedside states patient goes to [facility] for infusions.  Thinks it is chemotherapy.  States she knows she had a colonoscopy and had inconclusive polyp and blood in stools.

## 2023-11-24 NOTE — PHARMACOTHERAPY INTERVENTION NOTE - COMMENTS
Medication reconciliation completed.  Pt unable to provide medication history in present state, current med list taken from Dr. Chavez MedHx profile.  Pt's mother contacted by Dr. Constantino and she confirms that she does not know pt's current medications.  Pt is visiting/student from out of state, using a [email or web address] pharmacy by her school.  Pt has had both Lamictal ER 100mg and 50mg filled within date, unknown if pt's current dose is 150mg (both together), 100mg, or 50mg.

## 2023-11-24 NOTE — DISCHARGE NOTE PROVIDER - CARE PROVIDER_API CALL
ROBERT MONTENEGRO  [address] [place], [place] [address]  Phone: [phone]  Fax: [phone]  Follow Up Time: 1 week

## 2023-11-24 NOTE — DISCHARGE NOTE PROVIDER - NSDCCPCAREPLAN_GEN_ALL_CORE_FT
PRINCIPAL DISCHARGE DIAGNOSIS  Diagnosis: Recurrent seizures  Assessment and Plan of Treatment:

## 2023-11-24 NOTE — ED PROVIDER NOTE - CLINICAL SUMMARY MEDICAL DECISION MAKING FREE TEXT BOX
21 yo F with epilepsy and recurrent seizures , witnessed in ED.  IV ativan and IV keppra used to break the seizures.  Will admit to med/surg with neuro consult.

## 2023-11-27 LAB
LAMOTRIGINE SERPL-MCNC: 3.3 UG/ML — SIGNIFICANT CHANGE UP (ref 2–20)
LAMOTRIGINE SERPL-MCNC: 3.3 UG/ML — SIGNIFICANT CHANGE UP (ref 2–20)

## 2023-11-30 DIAGNOSIS — G40.909 EPILEPSY, UNSPECIFIED, NOT INTRACTABLE, WITHOUT STATUS EPILEPTICUS: ICD-10-CM

## 2023-12-15 NOTE — DISCHARGE NOTE PROVIDER - NSDCQMAMI_CARD_ALL_CORE
Referral received from medical team for risk screen. Dl Regan is a 22 m.o. old previously healthy male who presents after arrest. Etiology remains unknown at this time, but with concerning neurologic exam. I spoke with the patient's mother, Gildardo Fonseca at the bedside. Dr. Ban Liu with Child Protection has also been consulted. Per mother, she had patient with her as she was doing [employer] on 12/14.  Patient became unresponsive and EMS was called to [facility] and patient was transported to [facility] ED then to [facility]. Per mother, patient was healthy prior to this admission and patient's mother reports not knowing what led to patient's current condition.     Patient resides with mother (Phone: [phone]). Patient's Father Carrington Regan is currently incarcerated. Patient also has 3 older siblings that are in the care and custody of their father in [place]. Per mother, patient is followed by [facility] for [facility] and was born at [place].     Plan: Report made to [facility] due to concern of no explanation for patient's presentation (call Id #[id number]). Micaela Spear is the assigned worker (Phone: [phone]) and spoke with patient's mother.  Per Micaela-There are No visitation restrictions at this time and patient's mother can provide consent. Dr. Liu from Child Protection Team following patient-no immediate concerns.  Patient to have MRI and further work up including skeletal survey. I spoke with patient's mother about a referral to [facility]. Patient's mother declined a referral at this time. This SW will remain involved and available to assist as needed. Please call on call SW [phone] over the weekend as needed. Charge Nurse Updated.     **Please note patient's name is Cachorro Regan. Medical records aware.     LOPEZ Jean Baptiste       No

## 2024-05-27 NOTE — PHYSICAL THERAPY INITIAL EVALUATION ADULT - ORIENTATION, REHAB EVAL
oriented to person, place, time and situation
Alert-The patient is alert, awake and responds to voice. The patient is oriented to time, place, and person. The triage nurse is able to obtain subjective information.

## 2025-02-15 NOTE — PROGRESS NOTE ADULT - ATTENDING COMMENTS
Seen with Dr Fenton, Resident     Recurrent seizures and incidental intracranial aneurysm  Due EGD/Colonoscopy today for hx of bloody stool    Clinically stable  Good motor function b/l  Cognitively intact     Mother gave consent for procedure     Lab results yesterday unremarkable    Continue PT/OT  Proceed with Endoscopy as planned  Continue NPO/IVF till procedure today  Est dc 3/2 No